# Patient Record
Sex: MALE | Race: WHITE | NOT HISPANIC OR LATINO | Employment: FULL TIME | URBAN - METROPOLITAN AREA
[De-identification: names, ages, dates, MRNs, and addresses within clinical notes are randomized per-mention and may not be internally consistent; named-entity substitution may affect disease eponyms.]

---

## 2017-10-23 ENCOUNTER — OFFICE VISIT - HEALTHEAST (OUTPATIENT)
Dept: FAMILY MEDICINE | Facility: CLINIC | Age: 27
End: 2017-10-23

## 2017-10-23 DIAGNOSIS — J34.89 NASAL SORE: ICD-10-CM

## 2017-10-23 ASSESSMENT — MIFFLIN-ST. JEOR: SCORE: 1893.88

## 2017-12-28 ENCOUNTER — OFFICE VISIT - HEALTHEAST (OUTPATIENT)
Dept: FAMILY MEDICINE | Facility: CLINIC | Age: 27
End: 2017-12-28

## 2017-12-28 DIAGNOSIS — B35.1 NAIL FUNGUS: ICD-10-CM

## 2017-12-28 DIAGNOSIS — R19.09 NODULE OF GROIN: ICD-10-CM

## 2018-04-05 ENCOUNTER — OFFICE VISIT - HEALTHEAST (OUTPATIENT)
Dept: FAMILY MEDICINE | Facility: CLINIC | Age: 28
End: 2018-04-05

## 2018-04-05 DIAGNOSIS — I73.00 RAYNAUD'S PHENOMENON WITHOUT GANGRENE: ICD-10-CM

## 2018-04-05 DIAGNOSIS — B35.1 NAIL FUNGUS: ICD-10-CM

## 2018-04-05 LAB
ALBUMIN SERPL-MCNC: 3.7 G/DL (ref 3.5–5)
ALP SERPL-CCNC: 70 U/L (ref 45–120)
ALT SERPL W P-5'-P-CCNC: 60 U/L (ref 0–45)
ANION GAP SERPL CALCULATED.3IONS-SCNC: 12 MMOL/L (ref 5–18)
AST SERPL W P-5'-P-CCNC: 64 U/L (ref 0–40)
BILIRUB SERPL-MCNC: 1.2 MG/DL (ref 0–1)
BUN SERPL-MCNC: 9 MG/DL (ref 8–22)
CALCIUM SERPL-MCNC: 9.5 MG/DL (ref 8.5–10.5)
CHLORIDE BLD-SCNC: 102 MMOL/L (ref 98–107)
CO2 SERPL-SCNC: 24 MMOL/L (ref 22–31)
CREAT SERPL-MCNC: 1.22 MG/DL (ref 0.7–1.3)
GFR SERPL CREATININE-BSD FRML MDRD: >60 ML/MIN/1.73M2
GLUCOSE BLD-MCNC: 75 MG/DL (ref 70–125)
POTASSIUM BLD-SCNC: 3.8 MMOL/L (ref 3.5–5)
PROT SERPL-MCNC: 7.3 G/DL (ref 6–8)
SODIUM SERPL-SCNC: 138 MMOL/L (ref 136–145)

## 2018-04-05 ASSESSMENT — MIFFLIN-ST. JEOR: SCORE: 1889.11

## 2018-04-06 ENCOUNTER — AMBULATORY - HEALTHEAST (OUTPATIENT)
Dept: FAMILY MEDICINE | Facility: CLINIC | Age: 28
End: 2018-04-06

## 2018-04-06 DIAGNOSIS — R79.89 ABNORMAL LFTS (LIVER FUNCTION TESTS): ICD-10-CM

## 2018-04-07 ENCOUNTER — COMMUNICATION - HEALTHEAST (OUTPATIENT)
Dept: FAMILY MEDICINE | Facility: CLINIC | Age: 28
End: 2018-04-07

## 2019-01-11 ENCOUNTER — OFFICE VISIT - HEALTHEAST (OUTPATIENT)
Dept: FAMILY MEDICINE | Facility: CLINIC | Age: 29
End: 2019-01-11

## 2019-01-11 DIAGNOSIS — B35.1 ONYCHOMYCOSIS: ICD-10-CM

## 2019-01-11 DIAGNOSIS — D21.9 FIBROMA: ICD-10-CM

## 2019-01-11 DIAGNOSIS — R21 RASH: ICD-10-CM

## 2019-01-11 ASSESSMENT — MIFFLIN-ST. JEOR: SCORE: 1952.62

## 2019-07-11 ENCOUNTER — OFFICE VISIT - HEALTHEAST (OUTPATIENT)
Dept: FAMILY MEDICINE | Facility: CLINIC | Age: 29
End: 2019-07-11

## 2019-07-11 DIAGNOSIS — S46.219A BICEPS STRAIN, INITIAL ENCOUNTER: ICD-10-CM

## 2019-07-11 DIAGNOSIS — R22.0 CHEEK SWELLING: ICD-10-CM

## 2019-07-11 ASSESSMENT — MIFFLIN-ST. JEOR: SCORE: 1957.15

## 2019-08-01 ENCOUNTER — COMMUNICATION - HEALTHEAST (OUTPATIENT)
Dept: SCHEDULING | Facility: CLINIC | Age: 29
End: 2019-08-01

## 2019-08-05 ENCOUNTER — OFFICE VISIT - HEALTHEAST (OUTPATIENT)
Dept: FAMILY MEDICINE | Facility: CLINIC | Age: 29
End: 2019-08-05

## 2019-08-05 DIAGNOSIS — M25.561 CHRONIC PAIN OF RIGHT KNEE: ICD-10-CM

## 2019-08-05 DIAGNOSIS — L73.1 INGROWN HAIR: ICD-10-CM

## 2019-08-05 DIAGNOSIS — G89.29 CHRONIC PAIN OF RIGHT KNEE: ICD-10-CM

## 2019-08-05 ASSESSMENT — MIFFLIN-ST. JEOR: SCORE: 1936.06

## 2019-11-25 ENCOUNTER — OFFICE VISIT - HEALTHEAST (OUTPATIENT)
Dept: FAMILY MEDICINE | Facility: CLINIC | Age: 29
End: 2019-11-25

## 2019-11-25 DIAGNOSIS — M70.21 OLECRANON BURSITIS OF RIGHT ELBOW: ICD-10-CM

## 2019-11-25 DIAGNOSIS — M25.521 RIGHT ELBOW PAIN: ICD-10-CM

## 2019-11-25 ASSESSMENT — MIFFLIN-ST. JEOR: SCORE: 1945.13

## 2019-12-26 ENCOUNTER — COMMUNICATION - HEALTHEAST (OUTPATIENT)
Dept: FAMILY MEDICINE | Facility: CLINIC | Age: 29
End: 2019-12-26

## 2019-12-26 DIAGNOSIS — M70.21 OLECRANON BURSITIS OF RIGHT ELBOW: ICD-10-CM

## 2020-01-20 ENCOUNTER — RECORDS - HEALTHEAST (OUTPATIENT)
Dept: ADMINISTRATIVE | Facility: OTHER | Age: 30
End: 2020-01-20

## 2020-10-16 ENCOUNTER — OFFICE VISIT - HEALTHEAST (OUTPATIENT)
Dept: FAMILY MEDICINE | Facility: CLINIC | Age: 30
End: 2020-10-16

## 2020-10-16 DIAGNOSIS — R12 HEARTBURN: ICD-10-CM

## 2020-10-16 DIAGNOSIS — R07.89 CHEST TIGHTNESS: ICD-10-CM

## 2020-10-19 LAB
ATRIAL RATE - MUSE: 62 BPM
DIASTOLIC BLOOD PRESSURE - MUSE: NORMAL
INTERPRETATION ECG - MUSE: NORMAL
P AXIS - MUSE: -3 DEGREES
PR INTERVAL - MUSE: 134 MS
QRS DURATION - MUSE: 92 MS
QT - MUSE: 374 MS
QTC - MUSE: 379 MS
R AXIS - MUSE: -36 DEGREES
SYSTOLIC BLOOD PRESSURE - MUSE: NORMAL
T AXIS - MUSE: 42 DEGREES
VENTRICULAR RATE- MUSE: 62 BPM

## 2020-10-23 ENCOUNTER — COMMUNICATION - HEALTHEAST (OUTPATIENT)
Dept: FAMILY MEDICINE | Facility: CLINIC | Age: 30
End: 2020-10-23

## 2020-10-23 ENCOUNTER — OFFICE VISIT - HEALTHEAST (OUTPATIENT)
Dept: FAMILY MEDICINE | Facility: CLINIC | Age: 30
End: 2020-10-23

## 2020-10-23 DIAGNOSIS — M62.89 MUSCLE TIGHTNESS: ICD-10-CM

## 2020-10-23 DIAGNOSIS — K21.9 GASTROESOPHAGEAL REFLUX DISEASE WITHOUT ESOPHAGITIS: ICD-10-CM

## 2020-10-23 DIAGNOSIS — R07.89 FEELING OF CHEST TIGHTNESS: ICD-10-CM

## 2020-10-23 LAB
ALBUMIN SERPL-MCNC: 4.8 G/DL (ref 3.5–5)
ALP SERPL-CCNC: 48 U/L (ref 45–120)
ALT SERPL W P-5'-P-CCNC: 21 U/L (ref 0–45)
ANION GAP SERPL CALCULATED.3IONS-SCNC: 10 MMOL/L (ref 5–18)
AST SERPL W P-5'-P-CCNC: 26 U/L (ref 0–40)
BILIRUB SERPL-MCNC: 1.4 MG/DL (ref 0–1)
BUN SERPL-MCNC: 18 MG/DL (ref 8–22)
CALCIUM SERPL-MCNC: 9.9 MG/DL (ref 8.5–10.5)
CHLORIDE BLD-SCNC: 105 MMOL/L (ref 98–107)
CO2 SERPL-SCNC: 26 MMOL/L (ref 22–31)
CREAT SERPL-MCNC: 1.19 MG/DL (ref 0.7–1.3)
ERYTHROCYTE [DISTWIDTH] IN BLOOD BY AUTOMATED COUNT: 14.5 % (ref 11–14.5)
GFR SERPL CREATININE-BSD FRML MDRD: >60 ML/MIN/1.73M2
GLUCOSE BLD-MCNC: 91 MG/DL (ref 70–125)
HCT VFR BLD AUTO: 49.2 % (ref 40–54)
HGB BLD-MCNC: 16.4 G/DL (ref 14–18)
MCH RBC QN AUTO: 27.8 PG (ref 27–34)
MCHC RBC AUTO-ENTMCNC: 33.3 G/DL (ref 32–36)
MCV RBC AUTO: 83 FL (ref 80–100)
PLATELET # BLD AUTO: 159 THOU/UL (ref 140–440)
PMV BLD AUTO: 10 FL (ref 7–10)
POTASSIUM BLD-SCNC: 4.7 MMOL/L (ref 3.5–5)
PROT SERPL-MCNC: 7.7 G/DL (ref 6–8)
RBC # BLD AUTO: 5.91 MILL/UL (ref 4.4–6.2)
SODIUM SERPL-SCNC: 141 MMOL/L (ref 136–145)
WBC: 5.2 THOU/UL (ref 4–11)

## 2020-10-23 ASSESSMENT — ANXIETY QUESTIONNAIRES
6. BECOMING EASILY ANNOYED OR IRRITABLE: SEVERAL DAYS
1. FEELING NERVOUS, ANXIOUS, OR ON EDGE: MORE THAN HALF THE DAYS
4. TROUBLE RELAXING: NOT AT ALL
IF YOU CHECKED OFF ANY PROBLEMS ON THIS QUESTIONNAIRE, HOW DIFFICULT HAVE THESE PROBLEMS MADE IT FOR YOU TO DO YOUR WORK, TAKE CARE OF THINGS AT HOME, OR GET ALONG WITH OTHER PEOPLE: NOT DIFFICULT AT ALL
GAD7 TOTAL SCORE: 3
2. NOT BEING ABLE TO STOP OR CONTROL WORRYING: NOT AT ALL
7. FEELING AFRAID AS IF SOMETHING AWFUL MIGHT HAPPEN: NOT AT ALL
3. WORRYING TOO MUCH ABOUT DIFFERENT THINGS: NOT AT ALL
5. BEING SO RESTLESS THAT IT IS HARD TO SIT STILL: NOT AT ALL

## 2020-11-10 ENCOUNTER — HOSPITAL ENCOUNTER (OUTPATIENT)
Dept: CARDIOLOGY | Facility: HOSPITAL | Age: 30
Discharge: HOME | End: 2020-11-10
Attending: NURSE PRACTITIONER

## 2020-11-10 DIAGNOSIS — R07.89 FEELING OF CHEST TIGHTNESS: ICD-10-CM

## 2020-11-13 ENCOUNTER — COMMUNICATION - HEALTHEAST (OUTPATIENT)
Dept: FAMILY MEDICINE | Facility: CLINIC | Age: 30
End: 2020-11-13

## 2020-12-01 ENCOUNTER — COMMUNICATION - HEALTHEAST (OUTPATIENT)
Dept: FAMILY MEDICINE | Facility: CLINIC | Age: 30
End: 2020-12-01

## 2020-12-01 DIAGNOSIS — R07.89 FEELING OF CHEST TIGHTNESS: ICD-10-CM

## 2020-12-01 DIAGNOSIS — R00.2 PALPITATIONS: ICD-10-CM

## 2020-12-11 ENCOUNTER — COMMUNICATION - HEALTHEAST (OUTPATIENT)
Dept: CARDIOLOGY | Facility: CLINIC | Age: 30
End: 2020-12-11

## 2020-12-14 ENCOUNTER — OFFICE VISIT - HEALTHEAST (OUTPATIENT)
Dept: CARDIOLOGY | Facility: CLINIC | Age: 30
End: 2020-12-14

## 2020-12-14 DIAGNOSIS — R07.9 EXERTIONAL CHEST PAIN: ICD-10-CM

## 2020-12-14 DIAGNOSIS — Q21.12 PFO (PATENT FORAMEN OVALE): ICD-10-CM

## 2020-12-14 DIAGNOSIS — R06.09 DYSPNEA ON EXERTION: ICD-10-CM

## 2020-12-14 DIAGNOSIS — R00.2 PALPITATIONS: ICD-10-CM

## 2020-12-28 ENCOUNTER — OFFICE VISIT (OUTPATIENT)
Dept: FAMILY MEDICINE | Facility: CLINIC | Age: 30
End: 2020-12-28
Payer: COMMERCIAL

## 2020-12-28 VITALS
WEIGHT: 221 LBS | RESPIRATION RATE: 16 BRPM | HEART RATE: 73 BPM | SYSTOLIC BLOOD PRESSURE: 120 MMHG | BODY MASS INDEX: 32.73 KG/M2 | OXYGEN SATURATION: 98 % | TEMPERATURE: 98.3 F | DIASTOLIC BLOOD PRESSURE: 80 MMHG | HEIGHT: 69 IN

## 2020-12-28 DIAGNOSIS — F41.9 ANXIETY: ICD-10-CM

## 2020-12-28 DIAGNOSIS — F32.1 CURRENT MODERATE EPISODE OF MAJOR DEPRESSIVE DISORDER WITHOUT PRIOR EPISODE (H): Primary | ICD-10-CM

## 2020-12-28 PROBLEM — L70.9 ACNE: Status: ACTIVE | Noted: 2020-12-28

## 2020-12-28 PROBLEM — B35.1 NAIL FUNGUS: Status: ACTIVE | Noted: 2018-04-05

## 2020-12-28 PROBLEM — I73.00 RAYNAUD'S PHENOMENON WITHOUT GANGRENE: Status: ACTIVE | Noted: 2018-04-05

## 2020-12-28 PROCEDURE — 96127 BRIEF EMOTIONAL/BEHAV ASSMT: CPT | Mod: 59 | Performed by: NURSE PRACTITIONER

## 2020-12-28 PROCEDURE — 99214 OFFICE O/P EST MOD 30 MIN: CPT | Performed by: NURSE PRACTITIONER

## 2020-12-28 RX ORDER — FLUTICASONE PROPIONATE 50 MCG
1 SPRAY, SUSPENSION (ML) NASAL DAILY
COMMUNITY
End: 2021-02-26

## 2020-12-28 SDOH — HEALTH STABILITY: MENTAL HEALTH: HOW OFTEN DO YOU HAVE A DRINK CONTAINING ALCOHOL?: MONTHLY OR LESS

## 2020-12-28 SDOH — HEALTH STABILITY: MENTAL HEALTH: HOW OFTEN DO YOU HAVE 6 OR MORE DRINKS ON ONE OCCASION?: NOT ASKED

## 2020-12-28 SDOH — HEALTH STABILITY: MENTAL HEALTH: HOW MANY STANDARD DRINKS CONTAINING ALCOHOL DO YOU HAVE ON A TYPICAL DAY?: 1 OR 2

## 2020-12-28 ASSESSMENT — ANXIETY QUESTIONNAIRES
1. FEELING NERVOUS, ANXIOUS, OR ON EDGE: MORE THAN HALF THE DAYS
6. BECOMING EASILY ANNOYED OR IRRITABLE: SEVERAL DAYS
IF YOU CHECKED OFF ANY PROBLEMS ON THIS QUESTIONNAIRE, HOW DIFFICULT HAVE THESE PROBLEMS MADE IT FOR YOU TO DO YOUR WORK, TAKE CARE OF THINGS AT HOME, OR GET ALONG WITH OTHER PEOPLE: SOMEWHAT DIFFICULT
2. NOT BEING ABLE TO STOP OR CONTROL WORRYING: SEVERAL DAYS
GAD7 TOTAL SCORE: 10
3. WORRYING TOO MUCH ABOUT DIFFERENT THINGS: MORE THAN HALF THE DAYS
7. FEELING AFRAID AS IF SOMETHING AWFUL MIGHT HAPPEN: SEVERAL DAYS
5. BEING SO RESTLESS THAT IT IS HARD TO SIT STILL: MORE THAN HALF THE DAYS

## 2020-12-28 ASSESSMENT — PATIENT HEALTH QUESTIONNAIRE - PHQ9
5. POOR APPETITE OR OVEREATING: SEVERAL DAYS
SUM OF ALL RESPONSES TO PHQ QUESTIONS 1-9: 11

## 2020-12-28 ASSESSMENT — MIFFLIN-ST. JEOR: SCORE: 1966.79

## 2020-12-28 NOTE — PATIENT INSTRUCTIONS
Our Clinic hours are:  Mondays    7:20 am - 7 pm  Tues -  Fri  7:20 am - 5 pm    Clinic Phone: [phone]    The clinic lab opens at 7:30 am Mon - Fri and appointments are required.    [facility]. [phone]  Monday  8 am - 7pm  Tues - Fri 8 am - 5:30 pm

## 2020-12-28 NOTE — PROGRESS NOTES
Subjective     Emmett Infante is a 30 year old male who presents to clinic today for the following health issues:    HPI         Abnormal Mood Symptoms  Onset/Duration: about 3 weeks patient  Feelings have  gotten worse . But he has had some anxiety for about 2-3 years   Description:   Depression (if yes, do PHQ-9): YES  Anxiety (if yes, do MARTHA-7): YES  Accompanying Signs & Symptoms:  Still participating in activities that you used to enjoy: no  Fatigue: YES  Irritability: YES  Difficulty concentrating: YES  Changes in appetite: YES  Problems with sleep: YES  Heart racing/beating fast: YES  Abnormally elevated, expansive, or irritable mood: YES  Persistently increased activity or energy: YES  Thoughts of hurting yourself or others: no  History:  Recent stress or major life event: no  Prior depression or anxiety: yes   Family history of depression or anxiety: no  Alcohol/drug use: no  Difficulty sleeping: YES  Precipitating or alleviating factors: None  Therapies tried and outcome: individual therapy    PHQ 12/28/2020   PHQ-9 Total Score 11   Q9: Thoughts of better off dead/self-harm past 2 weeks Not at all     MARTHA-7 SCORE 12/28/2020   Total Score 10     He has not been on medication for his mental health in the past. He had been having heart palpitations. Recently had a holter monitor to assess this and it was normal. He has not been working out as often as he would like. He has been working with this therapist weekly. Overall symptoms have seemed to even out. He has been having difficulty with sleeping and has concerns of possible deviated septum. He did have this evaluated by the ENT recently. He is following up with ENT regarding the sleeping issue.     Review of Systems   Constitutional, HEENT, cardiovascular, pulmonary, GI, , musculoskeletal, neuro, skin, endocrine and psych systems are negative, except as otherwise noted.      Objective    /80 (BP Location: Right arm, Patient Position: Chair, Cuff Size:  "Adult Large)   Pulse 73   Temp 98.3  F (36.8  C)   Resp 16   Ht 1.759 m (5' 9.25\")   Wt 100.2 kg (221 lb)   SpO2 98%   BMI 32.40 kg/m    Body mass index is 32.4 kg/m .  Physical Exam   GENERAL: healthy, alert and no distress  NECK: no adenopathy, no asymmetry, masses, or scars and thyroid normal to palpation  RESP: lungs clear to auscultation - no rales, rhonchi or wheezes  CV: regular rate and rhythm, normal S1 S2, no S3 or S4, no murmur, click or rub, no peripheral edema and peripheral pulses strong  ABDOMEN: soft, nontender, no hepatosplenomegaly, no masses and bowel sounds normal  MS: no gross musculoskeletal defects noted, no edema  PSYCH: mentation appears normal, affect normal/bright    Labs completed in October are normal. Normal Thyroid hx.           Assessment & Plan     Emmett was seen today for establish care and anxiety.    Diagnoses and all orders for this visit:    Current moderate episode of major depressive disorder without prior episode (H)  -     sertraline (ZOLOFT) 50 MG tablet; Take 1 tablet (50 mg) by mouth daily    Anxiety    Opting to start patient on 50 mg of sertraline for management of anxiety and depression.  Reviewed previous labs as well as notes.  He was a previous patient of mine from the [facility].  Recommend following up with a video visit in approximately 4 to 6 weeks to reassess tolerance to medication.  He could certainly follow-up with me sooner as needed.  Discussed risks and benefits of the medication as well as potential side effects.  Patient is in agreement this plan.     BMI:   Estimated body mass index is 32.4 kg/m  as calculated from the following:    Height as of this encounter: 1.759 m (5' 9.25\").    Weight as of this encounter: 100.2 kg (221 lb).          Depression Screening Follow Up    PHQ 12/28/2020   PHQ-9 Total Score 11   Q9: Thoughts of better off dead/self-harm past 2 weeks Not at all         Follow Up Actions Taken  Patient counseled, no " additional follow up at this time.             No follow-ups on file.    FIDEL Tsang CNP  [facility]

## 2020-12-28 NOTE — LETTER
My Depression Action Plan  Name: Emmett Infante   Date of Birth 1990  Date: 12/28/2020    My doctor: Alberto [facility]   My clinic: [facility]  [id number]  [facility] [address]  [phone]          GREEN    ZONE   Good Control    What it looks like:     Things are going generally well. You have normal ups and downs. You may even feel depressed from time to time, but bad moods usually last less than a day.   What you need to do:  1. Continue to care for yourself (see self care plan)  2. Check your depression survival kit and update it as needed  3. Follow your physician s recommendations including any medication.  4. Do not stop taking medication unless you consult with your physician first.           YELLOW         ZONE Getting Worse    What it looks like:     Depression is starting to interfere with your life.     It may be hard to get out of bed; you may be starting to isolate yourself from others.    Symptoms of depression are starting to last most all day and this has happened for several days.     You may have suicidal thoughts but they are not constant.   What you need to do:     1. Call your care team. Your response to treatment will improve if you keep your care team informed of your progress. Yellow periods are signs an adjustment may need to be made.     2. Continue your self-care.  Just get dressed and ready for the day.  Don't give yourself time to talk yourself out of it.    3. Talk to someone in your support network.    4. Open up your Depression Self-Care Plan/Wellness Kit.           RED    ZONE Medical Alert - Get Help    What it looks like:     Depression is seriously interfering with your life.     You may experience these or other symptoms: You can t get out of bed most days, can t work or engage in other necessary activities, you have trouble taking care of basic hygiene, or basic responsibilities, thoughts of suicide or  death that will not go away, self-injurious behavior.     What you need to do:  1. Call your care team and request a same-day appointment. If they are not available (weekends or after hours) call your local crisis line, emergency room or 911.            Depression Self-Care Plan / Wellness Kit    Self-Care for Depression  Here s the deal. Your body and mind are really not as separate as most people think.  What you do and think affects how you feel and how you feel influences what you do and think. This means if you do things that people who feel good do, it will help you feel better.  Sometimes this is all it takes.  There is also a place for medication and therapy depending on how severe your depression is, so be sure to consult with your medical provider and/ or Behavioral Health Consultant if your symptoms are worsening or not improving.     In order to better manage my stress, I will:    Exercise  Get some form of exercise, every day. This will help reduce pain and release endorphins, the  feel good  chemicals in your brain. This is almost as good as taking antidepressants!  This is not the same as joining a gym and then never going! (they count on that by the way ) It can be as simple as just going for a walk or doing some gardening, anything that will get you moving.      Hygiene   Maintain good hygiene (get out of bed in the morning, make your bed, brush your teeth, take a shower, and get dressed like you were going to work, even if you are unemployed).  If your clothes don't fit try to get ones that do.    Diet  Strive to eat foods that are good for me, drink plenty of water, and avoid excessive sugar, caffeine, alcohol, and other mood-altering substances.  Some foods that are helpful in depression are: complex carbohydrates, B vitamins, flaxseed, fish or fish oil, fresh fruits and vegetables.    Psychotherapy  Agree to participate in Individual Therapy (if recommended).    Medication  If prescribed  medications, I agree to take them.  Missing doses can result in serious side effects.  I understand that drinking alcohol, or other illicit drug use, may cause potential side effects.  I will not stop my medication abruptly without first discussing it with my provider.    Staying Connected With Others  Stay in touch with my friends, family members, and my primary care provider/team.    Use your imagination  Be creative.  We all have a creative side; it doesn t matter if it s oil painting, sand castles, or mud pies! This will also kick up the endorphins.    Witness Beauty  (AKA stop and smell the roses) Take a look outside, even in mid-winter. Notice colors, textures. Watch the squirrels and birds.     Service to others  Be of service to others.  There is always someone else in need.  By helping others we can  get out of ourselves  and remember the really important things.  This also provides opportunities for practicing all the other parts of the program.    Humor  Laugh and be silly!  Adjust your TV habits for less news and crime-drama and more comedy.    Control your stress  Try breathing deep, massage therapy, biofeedback, and meditation. Find time to relax each day.     Crisis Text Line  http://www.crisistextline.org    The Crisis Text Line serves anyone, in any type of crisis, providing access to free, 24/7 support and information via the medium people already use and trust:    Here's how it works:  1.  Text 074-821 from anywhere in the [place], anytime, about any type of crisis.  2.  A live, trained Crisis Counselor receives the text and responds quickly.  3.  The volunteer Crisis Counselor will help you move from a 'hot moment to a cool moment'.    My support system    Clinic Contact:  Phone number:    Contact 1:  Phone number:    Contact 2:  Phone number:    Synagogue/:  Phone number:    Therapist:  Phone number:    Local crisis center:    Phone number:    Other community support:  Phone number:

## 2020-12-29 ASSESSMENT — ANXIETY QUESTIONNAIRES: GAD7 TOTAL SCORE: 10

## 2021-01-02 ENCOUNTER — MYC MEDICAL ADVICE (OUTPATIENT)
Dept: FAMILY MEDICINE | Facility: CLINIC | Age: 31
End: 2021-01-02

## 2021-01-06 ENCOUNTER — HOSPITAL ENCOUNTER (OUTPATIENT)
Dept: CARDIOLOGY | Facility: HOSPITAL | Age: 31
Discharge: HOME | End: 2021-01-06
Attending: GENERAL ACUTE CARE HOSPITAL

## 2021-01-06 DIAGNOSIS — R07.9 EXERTIONAL CHEST PAIN: ICD-10-CM

## 2021-01-06 DIAGNOSIS — R06.09 DYSPNEA ON EXERTION: ICD-10-CM

## 2021-01-06 LAB
CV STRESS CURRENT BP HE: NORMAL
CV STRESS CURRENT HR HE: 100
CV STRESS CURRENT HR HE: 103
CV STRESS CURRENT HR HE: 111
CV STRESS CURRENT HR HE: 112
CV STRESS CURRENT HR HE: 112
CV STRESS CURRENT HR HE: 114
CV STRESS CURRENT HR HE: 116
CV STRESS CURRENT HR HE: 117
CV STRESS CURRENT HR HE: 119
CV STRESS CURRENT HR HE: 129
CV STRESS CURRENT HR HE: 131
CV STRESS CURRENT HR HE: 133
CV STRESS CURRENT HR HE: 140
CV STRESS CURRENT HR HE: 143
CV STRESS CURRENT HR HE: 145
CV STRESS CURRENT HR HE: 158
CV STRESS CURRENT HR HE: 163
CV STRESS CURRENT HR HE: 166
CV STRESS CURRENT HR HE: 167
CV STRESS CURRENT HR HE: 175
CV STRESS CURRENT HR HE: 75
CV STRESS CURRENT HR HE: 81
CV STRESS CURRENT HR HE: 85
CV STRESS CURRENT HR HE: 90
CV STRESS CURRENT HR HE: 94
CV STRESS CURRENT HR HE: 94
CV STRESS CURRENT HR HE: 98
CV STRESS CURRENT HR HE: 98
CV STRESS CURRENT HR HE: 99
CV STRESS CURRENT HR HE: 99
CV STRESS DEVIATION TIME HE: NORMAL
CV STRESS ECHO PERCENT HR HE: NORMAL
CV STRESS EXERCISE STAGE HE: NORMAL
CV STRESS FINAL RESTING BP HE: NORMAL
CV STRESS FINAL RESTING HR HE: 98
CV STRESS MAX HR HE: 175
CV STRESS MAX TREADMILL GRADE HE: 16
CV STRESS MAX TREADMILL SPEED HE: 4.2
CV STRESS PEAK DIA BP HE: NORMAL
CV STRESS PEAK SYS BP HE: NORMAL
CV STRESS PHASE HE: NORMAL
CV STRESS PROTOCOL HE: NORMAL
CV STRESS RESTING PT POSITION HE: NORMAL
CV STRESS ST DEVIATION AMOUNT HE: NORMAL
CV STRESS ST DEVIATION ELEVATION HE: NORMAL
CV STRESS ST EVELATION AMOUNT HE: NORMAL
CV STRESS TEST TYPE HE: NORMAL
CV STRESS TOTAL STAGE TIME MIN 1 HE: NORMAL
ECHO EJECTION FRACTION ESTIMATED: 65 %
RATE PRESSURE PRODUCT: NORMAL
STRESS ECHO BASELINE DIASTOLIC HE: 92
STRESS ECHO BASELINE HR: 78
STRESS ECHO BASELINE SYSTOLIC BP: 130
STRESS ECHO CALCULATED PERCENT HR: 91 %
STRESS ECHO LAST STRESS DIASTOLIC BP: 60
STRESS ECHO LAST STRESS HR: 175
STRESS ECHO LAST STRESS SYSTOLIC BP: 174
STRESS ECHO POST ESTIMATED WORKLOAD: 12.1
STRESS ECHO POST EXERCISE DUR MIN: 11
STRESS ECHO POST EXERCISE DUR SEC: 59
STRESS ECHO TARGET HR: 192

## 2021-01-07 ENCOUNTER — COMMUNICATION - HEALTHEAST (OUTPATIENT)
Dept: CARDIOLOGY | Facility: CLINIC | Age: 31
End: 2021-01-07

## 2021-01-12 ENCOUNTER — COMMUNICATION - HEALTHEAST (OUTPATIENT)
Dept: FAMILY MEDICINE | Facility: CLINIC | Age: 31
End: 2021-01-12

## 2021-01-12 ENCOUNTER — COMMUNICATION - HEALTHEAST (OUTPATIENT)
Dept: SCHEDULING | Facility: CLINIC | Age: 31
End: 2021-01-12

## 2021-01-15 ENCOUNTER — HEALTH MAINTENANCE LETTER (OUTPATIENT)
Age: 31
End: 2021-01-15

## 2021-02-26 ENCOUNTER — VIRTUAL VISIT (OUTPATIENT)
Dept: FAMILY MEDICINE | Facility: CLINIC | Age: 31
End: 2021-02-26
Payer: COMMERCIAL

## 2021-02-26 DIAGNOSIS — F41.9 ANXIETY: ICD-10-CM

## 2021-02-26 DIAGNOSIS — G44.209 TENSION HEADACHE: ICD-10-CM

## 2021-02-26 DIAGNOSIS — F32.4 MAJOR DEPRESSIVE DISORDER WITH SINGLE EPISODE, IN PARTIAL REMISSION (H): Primary | ICD-10-CM

## 2021-02-26 PROCEDURE — 99213 OFFICE O/P EST LOW 20 MIN: CPT | Mod: 95 | Performed by: NURSE PRACTITIONER

## 2021-02-26 ASSESSMENT — ANXIETY QUESTIONNAIRES
6. BECOMING EASILY ANNOYED OR IRRITABLE: SEVERAL DAYS
3. WORRYING TOO MUCH ABOUT DIFFERENT THINGS: SEVERAL DAYS
7. FEELING AFRAID AS IF SOMETHING AWFUL MIGHT HAPPEN: NOT AT ALL
1. FEELING NERVOUS, ANXIOUS, OR ON EDGE: SEVERAL DAYS
5. BEING SO RESTLESS THAT IT IS HARD TO SIT STILL: SEVERAL DAYS
GAD7 TOTAL SCORE: 4
2. NOT BEING ABLE TO STOP OR CONTROL WORRYING: NOT AT ALL
IF YOU CHECKED OFF ANY PROBLEMS ON THIS QUESTIONNAIRE, HOW DIFFICULT HAVE THESE PROBLEMS MADE IT FOR YOU TO DO YOUR WORK, TAKE CARE OF THINGS AT HOME, OR GET ALONG WITH OTHER PEOPLE: SOMEWHAT DIFFICULT

## 2021-02-26 ASSESSMENT — PATIENT HEALTH QUESTIONNAIRE - PHQ9
SUM OF ALL RESPONSES TO PHQ QUESTIONS 1-9: 5
5. POOR APPETITE OR OVEREATING: NOT AT ALL

## 2021-02-26 NOTE — PROGRESS NOTES
"Emmett is a 30 year old who is being evaluated via a billable video visit.      How would you like to obtain your AVS? MyChart  If the video visit is dropped, the invitation should be resent by: Text to cell phone: [phone]  Will anyone else be joining your video visit? No      Video Start Time: 3:05pm    Assessment & Plan       ICD-10-CM    1. Major depressive disorder with single episode, in partial remission (H)  F32.4    2. Anxiety  F41.9    3. Tension headache  G44.209      Overall doing well with management of depression.  50 mg of sertraline has been working well for his management of depression and anxiety.  AMRTHA-7 score today is 4, PHQ-9 score today is 5.  Scores have significantly reduced.  Commend continuing on sertraline no dose change made today.  Reviewed risks and benefits of medication as well as potential side effects.  Follow up as needed or in 6 months for medication follow up.     Headache is likely from his activity and posture.  Discussed reducing weights when weight lifting do not pull tension in the neck.  Also advised stretching exercises to reduce tension.  If symptoms are not improving will evaluate further.         BMI:   Estimated body mass index is 32.4 kg/m  as calculated from the following:    Height as of 12/28/20: 1.759 m (5' 9.25\").    Weight as of 12/28/20: 100.2 kg (221 lb).           Return in about 6 months (around 8/26/2021) for depression.    FIDEL Tsang CNP  [facility]    Subjective   Emmett is a 30 year old who presents for the following health issues     HPI     Started zoloft 50mg has noticed some increased sweating on his palms of his hands. He has also noticed some increased ease of bleeding. Denies any sexual side effects.  Has noted improvement in depression overall. Has had improvement in response for anxiety symptoms.     He also mentions increased incidence of headaches since increasing working out again.  Reports that typically " with stretching or popping his trapezius symptoms improve.  They have not been persistent headaches typically resolve quickly.    Depression Followup    How are you doing with your depression since your last visit? Improved     Are you having other symptoms that might be associated with depression? No    Have you had a significant life event?  No     Are you feeling anxious or having panic attacks?   Yes:  anxious on and off    Do you have any concerns with your use of alcohol or other drugs? No    Social History     Tobacco Use     Smoking status: Never Smoker     Smokeless tobacco: Never Used   Substance Use Topics     Alcohol use: Yes     Alcohol/week: 2.0 standard drinks     Types: 2 Cans of beer per week     Frequency: Monthly or less     Drinks per session: 1 or 2     Drug use: Never     PHQ 12/28/2020 2/26/2021   PHQ-9 Total Score 11 5   Q9: Thoughts of better off dead/self-harm past 2 weeks Not at all Not at all     MARTHA-7 SCORE 12/28/2020 2/26/2021   Total Score 10 4     Last PHQ-9 2/26/2021   1.  Little interest or pleasure in doing things 1   2.  Feeling down, depressed, or hopeless 0   3.  Trouble falling or staying asleep, or sleeping too much 1   4.  Feeling tired or having little energy 1   5.  Poor appetite or overeating 1   6.  Feeling bad about yourself 0   7.  Trouble concentrating 1   8.  Moving slowly or restless 0   Q9: Thoughts of better off dead/self-harm past 2 weeks 0   PHQ-9 Total Score 5   Difficulty at work, home, or with people Somewhat difficult     MARTHA-7  2/26/2021   1. Feeling nervous, anxious, or on edge 1   2. Not being able to stop or control worrying 0   3. Worrying too much about different things 1   4. Trouble relaxing 0   5. Being so restless that it is hard to sit still 1   6. Becoming easily annoyed or irritable 1   7. Feeling afraid, as if something awful might happen 0   MARTHA-7 Total Score 4   If you checked any problems, how difficult have they made it for you to do your  work, take care of things at home, or get along with other people? Somewhat difficult       Suicide Assessment Five-step Evaluation and Treatment (SAFE-T)      How many servings of fruits and vegetables do you eat daily?  2-3    On average, how many sweetened beverages do you drink each day (Examples: soda, juice, sweet tea, etc.  Do NOT count diet or artificially sweetened beverages)?   1    How many days per week do you exercise enough to make your heart beat faster? 3 or less    How many minutes a day do you exercise enough to make your heart beat faster? 30 - 60    How many days per week do you miss taking your medication? 0        Review of Systems   Constitutional, HEENT, cardiovascular, pulmonary, gi and gu systems are negative, except as otherwise noted.      Objective           Vitals:  No vitals were obtained today due to virtual visit.    Physical Exam   GENERAL: Healthy, alert and no distress  EYES: Eyes grossly normal to inspection.  No discharge or erythema, or obvious scleral/conjunctival abnormalities.  RESP: No audible wheeze, cough, or visible cyanosis.  No visible retractions or increased work of breathing.    SKIN: Visible skin clear. No significant rash, abnormal pigmentation or lesions.  NEURO: Cranial nerves grossly intact.  Mentation and speech appropriate for age.  PSYCH: Mentation appears normal, affect normal/bright, judgement and insight intact, normal speech and appearance well-groomed.          Video-Visit Details    Type of service:  Video Visit    Video End Time:3:20pm    Originating Location (pt. Location): Home    Distant Location (provider location):  [facility]     Platform used for Video Visit: MINDBODY

## 2021-02-27 ASSESSMENT — ANXIETY QUESTIONNAIRES: GAD7 TOTAL SCORE: 4

## 2021-04-01 ENCOUNTER — MYC MEDICAL ADVICE (OUTPATIENT)
Dept: FAMILY MEDICINE | Facility: CLINIC | Age: 31
End: 2021-04-01

## 2021-04-02 ASSESSMENT — PATIENT HEALTH QUESTIONNAIRE - PHQ9
10. IF YOU CHECKED OFF ANY PROBLEMS, HOW DIFFICULT HAVE THESE PROBLEMS MADE IT FOR YOU TO DO YOUR WORK, TAKE CARE OF THINGS AT HOME, OR GET ALONG WITH OTHER PEOPLE: SOMEWHAT DIFFICULT
SUM OF ALL RESPONSES TO PHQ QUESTIONS 1-9: 8
SUM OF ALL RESPONSES TO PHQ QUESTIONS 1-9: 8

## 2021-04-03 ASSESSMENT — PATIENT HEALTH QUESTIONNAIRE - PHQ9: SUM OF ALL RESPONSES TO PHQ QUESTIONS 1-9: 8

## 2021-04-25 ENCOUNTER — MYC MEDICAL ADVICE (OUTPATIENT)
Dept: FAMILY MEDICINE | Facility: CLINIC | Age: 31
End: 2021-04-25

## 2021-05-03 ENCOUNTER — OFFICE VISIT (OUTPATIENT)
Dept: FAMILY MEDICINE | Facility: CLINIC | Age: 31
End: 2021-05-03
Payer: COMMERCIAL

## 2021-05-03 VITALS
WEIGHT: 226 LBS | HEART RATE: 65 BPM | SYSTOLIC BLOOD PRESSURE: 110 MMHG | TEMPERATURE: 96.6 F | RESPIRATION RATE: 16 BRPM | BODY MASS INDEX: 33.47 KG/M2 | OXYGEN SATURATION: 99 % | HEIGHT: 69 IN | DIASTOLIC BLOOD PRESSURE: 62 MMHG

## 2021-05-03 DIAGNOSIS — F41.9 ANXIETY: ICD-10-CM

## 2021-05-03 DIAGNOSIS — D17.30 LIPOMA OF SKIN AND SUBCUTANEOUS TISSUE: Primary | ICD-10-CM

## 2021-05-03 DIAGNOSIS — F32.1 CURRENT MODERATE EPISODE OF MAJOR DEPRESSIVE DISORDER WITHOUT PRIOR EPISODE (H): ICD-10-CM

## 2021-05-03 PROCEDURE — 99214 OFFICE O/P EST MOD 30 MIN: CPT | Performed by: NURSE PRACTITIONER

## 2021-05-03 RX ORDER — SERTRALINE HYDROCHLORIDE 100 MG/1
100 TABLET, FILM COATED ORAL DAILY
Qty: 90 TABLET | Refills: 1 | Status: SHIPPED | OUTPATIENT
Start: 2021-05-03 | End: 2021-11-15

## 2021-05-03 RX ORDER — CHLORAL HYDRATE 500 MG
2 CAPSULE ORAL
COMMUNITY

## 2021-05-03 ASSESSMENT — ANXIETY QUESTIONNAIRES
4. TROUBLE RELAXING: SEVERAL DAYS
1. FEELING NERVOUS, ANXIOUS, OR ON EDGE: MORE THAN HALF THE DAYS
6. BECOMING EASILY ANNOYED OR IRRITABLE: SEVERAL DAYS
2. NOT BEING ABLE TO STOP OR CONTROL WORRYING: SEVERAL DAYS
3. WORRYING TOO MUCH ABOUT DIFFERENT THINGS: MORE THAN HALF THE DAYS
GAD7 TOTAL SCORE: 8
5. BEING SO RESTLESS THAT IT IS HARD TO SIT STILL: SEVERAL DAYS
7. FEELING AFRAID AS IF SOMETHING AWFUL MIGHT HAPPEN: NOT AT ALL

## 2021-05-03 ASSESSMENT — PATIENT HEALTH QUESTIONNAIRE - PHQ9: SUM OF ALL RESPONSES TO PHQ QUESTIONS 1-9: 6

## 2021-05-03 ASSESSMENT — MIFFLIN-ST. JEOR: SCORE: 1985.51

## 2021-05-03 NOTE — PATIENT INSTRUCTIONS
Our Clinic hours are:  Mondays    7:20 am - 7 pm  Tues -  Fri  7:20 am - 5 pm    Clinic Phone: [phone]    The clinic lab opens at 7:30 am Mon - Fri and appointments are required.    [facility]. [phone]  Monday  8 am - 7pm  Tues - Fri 8 am - 5:30 pm

## 2021-05-03 NOTE — PROGRESS NOTES
"    Assessment & Plan     Lipoma of skin and subcutaneous tissue  Reassured patient that the lesion appears benign and no further workup is needed at this point. If symptoms change would recommend ultrasound to evaluate further.     Current moderate episode of major depressive disorder without prior episode (H)  Increased dose of sertraline to 100mg today, follow up if symptoms are not improving.   - sertraline (ZOLOFT) 100 MG tablet; Take 1 tablet (100 mg) by mouth daily    Anxiety  See above.   - sertraline (ZOLOFT) 100 MG tablet; Take 1 tablet (100 mg) by mouth daily             BMI:   Estimated body mass index is 33.37 kg/m  as calculated from the following:    Height as of this encounter: 1.753 m (5' 9\").    Weight as of this encounter: 102.5 kg (226 lb).           Return in about 6 months (around 11/3/2021) for medication follow up and physical. .    Monika Strong, FIDEL CNP  [facility]    Bart Christine is a 30 year old who presents for the following health issues  accompanied by his self :    HPI       Chief Complaint   Patient presents with     Mass     patient has a lump on the back of his left upper arm x 1 mo. Patient states it is staying the same in size and is a bit painful when he puts pressure on the area . Patient would rate this pain at about a 5 .     Has had some lipomas in the past. He has some minor tenderness to the bum in the back of his left arm in his lower tricep.  He has a similar bump on his right rib cage as well as left upper arm.  He also mentions he was diagnosed with a lipoma in his brain as well. He has been seen by Neurosurgery regarding this through [facility].  This was thought to be likely benign. He will be followed by neuro onc for monitoring of the lesion.     IMPRESSION:    1. 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  2. Otherwise unremarkable MRI of the brain.    Depression and Anxiety Follow-Up    How are " you doing with your depression since your last visit? Improved depression, anxiety slightly worse.     How are you doing with your anxiety since your last visit?  Monitoring and adjusting medications. Is now on 75mg and is planning on increasing to 100mg of sertraline.     Are you having other symptoms that might be associated with depression or anxiety? Yes:  life stressors/ work.     Have you had a significant life event? No     Do you have any concerns with your use of alcohol or other drugs? No    Social History     Tobacco Use     Smoking status: Never Smoker     Smokeless tobacco: Never Used   Substance Use Topics     Alcohol use: Yes     Alcohol/week: 2.0 standard drinks     Types: 2 Cans of beer per week     Frequency: Monthly or less     Drinks per session: 1 or 2     Drug use: Never     PHQ 2/26/2021 4/2/2021 5/3/2021   PHQ-9 Total Score 5 8 6   Q9: Thoughts of better off dead/self-harm past 2 weeks Not at all Several days Not at all   F/U: Thoughts of suicide or self-harm - No -   F/U: Safety concerns - No -     MARTHA-7 SCORE 12/28/2020 2/26/2021 5/3/2021   Total Score 10 4 8     Last PHQ-9 5/3/2021   1.  Little interest or pleasure in doing things 0   2.  Feeling down, depressed, or hopeless 2   3.  Trouble falling or staying asleep, or sleeping too much 1   4.  Feeling tired or having little energy 1   5.  Poor appetite or overeating 1   6.  Feeling bad about yourself 0   7.  Trouble concentrating 1   8.  Moving slowly or restless 0   Q9: Thoughts of better off dead/self-harm past 2 weeks 0   PHQ-9 Total Score 6   Difficulty at work, home, or with people -   In the past two weeks have you had thoughts of suicide or self harm? -   Do you have concerns about your personal safety or the safety of others? -     MARTHA-7  5/3/2021   1. Feeling nervous, anxious, or on edge 2   2. Not being able to stop or control worrying 1   3. Worrying too much about different things 2   4. Trouble relaxing 1   5. Being so  "restless that it is hard to sit still 1   6. Becoming easily annoyed or irritable 1   7. Feeling afraid, as if something awful might happen 0   MARTHA-7 Total Score 8   If you checked any problems, how difficult have they made it for you to do your work, take care of things at home, or get along with other people? -       Review of Systems   Constitutional, HEENT, cardiovascular, pulmonary, gi and gu systems are negative, except as otherwise noted.      Objective    /62 (BP Location: Right arm, Patient Position: Chair, Cuff Size: Adult Large)   Pulse 65   Temp 96.6  F (35.9  C)   Resp 16   Ht 1.753 m (5' 9\")   Wt 102.5 kg (226 lb)   SpO2 99%   BMI 33.37 kg/m    Body mass index is 33.37 kg/m .     Physical Exam   GENERAL: healthy, alert and no distress  MS: no gross musculoskeletal defects noted, no edema  SKIN: no suspicious lesions or rashes and papule - round, firm/ slightly fluctuant. No significant pain to palpation.   NEURO: Normal strength and tone, mentation intact and speech normal  PSYCH: mentation appears normal, affect normal/bright  LYMPH: no cervical, supraclavicular, axillary, or inguinal adenopathy                "

## 2021-05-04 ASSESSMENT — ANXIETY QUESTIONNAIRES: GAD7 TOTAL SCORE: 8

## 2021-05-27 VITALS
DIASTOLIC BLOOD PRESSURE: 79 MMHG | HEART RATE: 65 BPM | OXYGEN SATURATION: 100 % | SYSTOLIC BLOOD PRESSURE: 126 MMHG | TEMPERATURE: 97.9 F | RESPIRATION RATE: 18 BRPM

## 2021-05-28 ASSESSMENT — ANXIETY QUESTIONNAIRES: GAD7 TOTAL SCORE: 3

## 2021-05-30 NOTE — PROGRESS NOTES
"  Assessment/ Plan:         1. Biceps strain, initial encounter     2. Cheek swelling       Upper extremity pain likely secondary to a biceps strain versus an acute epicondylitis.  Advised patient to avoid activities that exacerbate the pain and to allow plenty of rest.  Also encouraged stretching and massage to help with the symptoms.  Ice may also be helpful.  Discussed with the patient that if symptoms are not improving or worsening he should follow-up in clinic.  May consider orthopedics referral or evaluation may also consider ultrasound.    Etiology of the cheek swelling is unclear.  Does not seem to be an allergic response of any sort.  Also does not appear to be an infectious source.  Certainly considered cranial nerve #5 but he has full use of full facial muscles without lack or change or persistent symptoms.  Discussed keeping a journal of when the symptoms occur and associated symptoms that may be present.  Follow-up if symptoms are worsening or becoming more persistent.  He is in agreement this plan.      Subjective:      Emmett Infante is a 28 y.o. male who presents for a couple of concerns today. First concern is of pain above the right elbow with flexion of his bicep with curls and pull-ups.   No numbness or tingling. Pain is a sharp and stabbing and is only present with exercise. He has massaged this and has helped.   His main concern is regarding his left cheek. He has noticed left cheek swelling the last one year. He has had this come and go without known pattern or aggravating factors. No pain is present with it is swollen. He will wake up in the morning with the swelling then by mid afternoon the swelling reduces. He shows me a picture and it reveals significant swelling. Hasn't noticed any lack of movement but it feels tighter when it occurs. No history of abscessed. He will have a \"weird sensation\" over his lower jaw near his mouth but this quickly resolves. The sensation is only present when he " "has swelling on his cheek. Right cheek has not even gotten swollen No other associated symptoms have been present. Has nasal congestion that is present persistently. Some seasonal allergy symptoms. Denies sinus pain or tenderness.     The following portions of the patient's history were reviewed and updated as appropriate: allergies, current medications and problem list.    Patient Active Problem List   Diagnosis     Patent Foramen Ovale     Acne     Nail fungus     Raynaud's phenomenon without gangrene       Current Outpatient Medications   Medication Sig     creatinine, bulk, 100 % Powd Use As Directed.     MULTIVIT &MINERALS/FERROUS FUM (MULTI VITAMIN ORAL) Take by mouth.     WHEY PROTEIN CONCEN/AMINO ACID (WHEY PROTEIN CONCENTRATE ORAL) Take by mouth.     terbinafine 1 % Gel Apply twice daily for 345 days.       Review of Systems   A 12 point comprehensive review of systems was negative except as noted.      Objective:      /80   Pulse 68   Resp 16   Ht 5' 9.5\" (1.765 m)   Wt 218 lb (98.9 kg)   BMI 31.73 kg/m      General Appearance: Alert, cooperative, appears slightly fatigued.  Head: Normocephalic, without obvious abnormality, atraumatic.  Eyes: PERRL, conjunctiva/corneas clear, EOM's intact.  Nose: Nares congested.  Throat: Slightly erythematous. No exudates.  No significant pupil swelling present.  No acute infection noted.  No abscessed teeth.  Dentition is within normal limits.  Neck: Supple, symmetrical, trachea midline, no adenopathy.  Heart : normal rate, regular rhythm, normal S1, S2, no murmurs, rubs, clicks or gallops.  Lungs: Clear to auscultation bilaterally, respirations unlabored.  Extremities: Extremities normal, atraumatic, no cyanosis or edema.  Strength equal bilaterally.  Normal muscle tone bilaterally.  Unable to fully elicit pain on exam.  Lymph nodes: Cervical, supraclavicular, and axillary nodes normal.  Neuro: Grossly Normal Cranial Nerve II-VI WNL    No results found for " this or any previous visit (from the past 168 hour(s)).      Monika Strong, CNP  1:47 PM

## 2021-05-31 VITALS — BODY MASS INDEX: 30.48 KG/M2 | WEIGHT: 205.8 LBS | HEIGHT: 69 IN

## 2021-05-31 VITALS — WEIGHT: 207 LBS | BODY MASS INDEX: 30.57 KG/M2

## 2021-05-31 NOTE — PROGRESS NOTES
Assessment/ Plan:       1. Ingrown hair  Axillary cyst appears to be an ingrown hair could be a slight small sebaceous cyst.  Advised patient to continue monitor site and to apply warm compress to it.  Discouraged picking or squeezing the site.  If it is worsening advised patient to follow-up via my chart and I can send in an antibiotic to help treat the site.  If it does open up would recommend patient to allow it to drain if redness and pain or fevers occur would recommend an antibiotic at that time.  He is in agreement this plan.  If it is enlarging and becoming more acutely painful and he wants it to be evaluated this would also be appropriate.  Discussed with patient that it may require an I&D if that occurs.    2. Chronic pain of right knee  Chronic pain in the right knee likely tendinitis secondary to the missing aspect of his meniscus.  Continue with symptomatic management and avoid activities that worsen symptoms.  Certainly could refer to orthopedics versus getting an MRI to evaluate further if needed.  Initially he would require an x-ray.  He declines this at this time.  Patient is in agreement with this plan.        Subjective:      Emmett Infante is a 28 y.o. male who presents for concerns of a lump under his right armpit. It was noticed 8 days ago, it was painful for those 2-3 days. It was large and red as well. It is now no longer painful or red.  He reports he was going to cancel the appointment but figured he should come and evaluated just in case.  He also mentions some decreased range of motion of his right knee.  He reports that he has part of his meniscus missing from his knee he denies any chronic pain at this but will pain will occur when he is having a lot of activity.  No other concerns regarding this.  Dee is feeling well.  He denies any chest pain, shortness of breath, or difficulty breathing.    The following portions of the patient's history were reviewed and updated as appropriate:  "allergies, current medications and problem list.    Patient Active Problem List   Diagnosis     Patent Foramen Ovale     Acne     Nail fungus     Raynaud's phenomenon without gangrene       Current Outpatient Medications   Medication Sig     creatinine, bulk, 100 % Powd Use As Directed.     MULTIVIT &MINERALS/FERROUS FUM (MULTI VITAMIN ORAL) Take by mouth.     WHEY PROTEIN CONCEN/AMINO ACID (WHEY PROTEIN CONCENTRATE ORAL) Take by mouth.       Review of Systems   A 12 point comprehensive review of systems was negative except as noted.      Objective:      /70   Pulse 60   Resp 16   Ht 5' 9.6\" (1.768 m)   Wt 213 lb (96.6 kg)   BMI 30.91 kg/m      General appearance: alert, appears stated age and cooperative  Head: Normocephalic, without obvious abnormality, atraumatic  Lungs: clear to auscultation bilaterally  Heart: regular rate and rhythm, S1, S2 normal, no murmur, click, rub or gallop  Extremities: extremities normal, atraumatic, no cyanosis or edema.  Mildly decreased range of motion of the right knee.  No acute knee pain on exam anterior and posterior drawer negative.  Varus and valgus testing is negative.  Skin: Small 5 mm cyst like lesion present in his right axilla red/purple in color beneath the surface but does appear to have a small area of umbilication that would indicate a fluid-filled pocket.  No acute pain on exam.  Mildly she went feeling.  Does not appear to be acutely infected.  Lymph nodes: Cervical, supraclavicular, and axillary nodes normal.  Neurologic: Grossly normal      Monika Strong CNP  4:04 PM  "

## 2021-05-31 NOTE — TELEPHONE ENCOUNTER
Triage call:   Sunday small lump/bump on inner arm - got bigger and darker in color on Monday but hasn't grown since then- right arm. Half inch and circular, lump is hard and it doesn't really move- doesn't hurt unless pressure is applied. Denies fever or itching.     Triaged to be seen within the next 3 days- reviewed additional care advice and patient verbalizes understanding. Patient warm transferred to scheduling for appointment. Appointment scheduled for Monday @ 4 pm with PCP.     Latonia Amaya RN [facility] Care Connection Triage/Med Refill 8/1/2019 12:17 PM    Reason for Disposition    [1] Small swelling or lump AND [2] unexplained AND [3] present > 1 week    Protocols used: SKIN LUMP OR LOCALIZED SWELLING-A-AH

## 2021-06-01 VITALS — WEIGHT: 203 LBS | HEIGHT: 70 IN | BODY MASS INDEX: 29.06 KG/M2

## 2021-06-02 ENCOUNTER — RECORDS - HEALTHEAST (OUTPATIENT)
Dept: ADMINISTRATIVE | Facility: CLINIC | Age: 31
End: 2021-06-02

## 2021-06-02 VITALS — BODY MASS INDEX: 31.07 KG/M2 | HEIGHT: 70 IN | WEIGHT: 217 LBS

## 2021-06-03 ENCOUNTER — RECORDS - HEALTHEAST (OUTPATIENT)
Dept: ADMINISTRATIVE | Facility: CLINIC | Age: 31
End: 2021-06-03

## 2021-06-03 VITALS — WEIGHT: 218 LBS | HEIGHT: 70 IN | BODY MASS INDEX: 31.21 KG/M2

## 2021-06-03 VITALS — WEIGHT: 213 LBS | HEIGHT: 70 IN | BODY MASS INDEX: 30.49 KG/M2

## 2021-06-03 NOTE — PROGRESS NOTES
Assessment/ Plan:         1. Right elbow pain     2. Olecranon bursitis of right elbow       Etiology of the right elbow pain is unclear.  I am suspicious that it is likely an olecranon bursitis.  Differential diagnosis included tendinitis versus bony abnormality.  Unlikely to be a bony abnormality as there was no previous injury.  Exam did not reveal any acute tendinitis or pain.  Discussed symptom management with the patient.  Offered x-ray; declined today.  Discussed symptomatic management and provided this in the after visit summary.  If symptoms are not improving or worsening would recommend x-ray and then additional imaging if needed.  He is in agreement with this plan.      Subjective:      Emmett Infante is a 28 y.o. male who presents for right elbow pain. No known injury. He recalls resting his elbow [place]  and he tried adjusting positioning. It is worsening and coming and going. Pain is described as a dull ache and will come and go and only be with certain positions/ events. Last night it hurt when his elbow was touching a blanket on his bed. When he straightens his arm he can feel it as well. No change in appearance. No swelling, redness, bruising. He typically lifts weights but have not for about 6 weeks. Pain started after he eased off working out. He decreased his working out and lifting due to time constraints. He denies any sharp, shooting pain. Described as a post injury ache without the injury.    No other muscle or joint pain is present. No other questions or concerns today.     The following portions of the patient's history were reviewed and updated as appropriate: allergies, current medications and problem list.    Patient Active Problem List   Diagnosis     Patent Foramen Ovale     Acne     Nail fungus     Raynaud's phenomenon without gangrene       Current Outpatient Medications   Medication Sig     creatinine, bulk, 100 % Powd Use As Directed.     MULTIVIT  "&MINERALS/FERROUS FUM (MULTI VITAMIN ORAL) Take by mouth.     WHEY PROTEIN CONCEN/AMINO ACID (WHEY PROTEIN CONCENTRATE ORAL) Take by mouth.       Review of Systems   A 12 point comprehensive review of systems was negative except as noted.      Objective:      /78   Pulse 60   Resp 16   Ht 5' 9.6\" (1.768 m)   Wt 215 lb (97.5 kg)   BMI 31.20 kg/m      General appearance: alert, appears stated age and cooperative  Head: Normocephalic, without obvious abnormality, atraumatic  Lungs: clear to auscultation bilaterally  Heart: regular rate and rhythm, S1, S2 normal, no murmur, click, rub or gallop  Extremities: extremities normal, atraumatic, no cyanosis or edema and right elbow: Normal ROM, no swelling, erythremia, bruising, or tenderness to palpation is present.  Skin: Skin color, texture, turgor normal. No rashes or lesions  Neurologic: Grossly normal      Monika Strong CNP  11:38 AM  "

## 2021-06-04 VITALS
SYSTOLIC BLOOD PRESSURE: 104 MMHG | HEIGHT: 70 IN | DIASTOLIC BLOOD PRESSURE: 78 MMHG | RESPIRATION RATE: 16 BRPM | HEART RATE: 60 BPM | BODY MASS INDEX: 30.78 KG/M2 | WEIGHT: 215 LBS

## 2021-06-05 VITALS
BODY MASS INDEX: 33.09 KG/M2 | SYSTOLIC BLOOD PRESSURE: 130 MMHG | WEIGHT: 228 LBS | RESPIRATION RATE: 18 BRPM | OXYGEN SATURATION: 100 % | HEART RATE: 60 BPM | DIASTOLIC BLOOD PRESSURE: 72 MMHG

## 2021-06-05 VITALS
SYSTOLIC BLOOD PRESSURE: 112 MMHG | TEMPERATURE: 97.9 F | DIASTOLIC BLOOD PRESSURE: 84 MMHG | OXYGEN SATURATION: 98 % | HEART RATE: 70 BPM | WEIGHT: 219 LBS | BODY MASS INDEX: 31.79 KG/M2

## 2021-06-12 NOTE — PROGRESS NOTES
"  Assessment/ Plan:         1. Feeling of chest tightness  Holter Monitor    Comprehensive Metabolic Panel    HM2(CBC w/o Differential)   2. Gastroesophageal reflux disease without esophagitis  Comprehensive Metabolic Panel    HM2(CBC w/o Differential)   3. Muscle tightness       I suspect that his chest tightness feeling may be related to his reflux symptoms.  However given his cardiac work-up in the past and his mildly enlarged atria I think it would be worthwhile evaluating further with a Holter monitor.  Labs will be collected as above.  At the time of this dictation his CBC is returned and he is not anemic.  I advised patient to monitor symptoms and to monitor symptoms of his reflux as well.  I discussed etiology of reflux with the patient today and advised patient to trial antacid medications and acid reducing food in his diet.    His concern regarding his neck and upper back is likely related to muscle tightness and poor posture.  On exam he did agree that this was likely the cause.  I encouraged him to return to regular cardiovascular exercise as tolerated.    In general symptoms are failing to improve or not improving I would recommend following up in clinic for reevaluation.  I will monitor for Holter monitor results.      Subjective:      Emmett Infante is a 29 y.o. male who presents for follow up from [facility] 1 weeks ago. He reports that he was worked up for heart burn and palpitations. He endorses tightness in his chest with lightheadedness. Symptoms started about 3 weeks ago intermittently. He reports that the \"episodes\" will last 10-15 seconds to no more than 1 min. Symptoms are typically the shorter range. He will have a sensation of the heart beats \"harder\". He will have symptoms at minimum daily. He denies any chest pain with the tightness, but this is all over the left side. He is not aware of any family history of heart disease. He reports that both of his grand fathers had heart attacks in their 60s " (he thinks). He reports having a cardiac workup around 2012 which ruled out a PFO.  Cardiac workup at that time was normal. Only abnormality was mild enlargement of his right atria.  He has had increased symptoms of heart burn recently. He is not sure if this is causing the heart symptoms. He is drinking a whey protien shake. He is no longer taking the creatine. He denies any other supplement use.      He reports that he also has a bone swelling at the base of his neck. He reports that it feels larger than it previously did. Or that he just hadn't noticed this previously    The heartburn seems to change with different foods. He reports that the heartburn incidence seemed to increase and possibly correlate with onset with the heart symptoms.     He has not been exercising and is feeling decreased cardiovascular endurance. He does not feel significant anxiety.       The following portions of the patient's history were reviewed and updated as appropriate: allergies, current medications and problem list.    Patient Active Problem List   Diagnosis     Patent Foramen Ovale     Acne     Nail fungus     Raynaud's phenomenon without gangrene       Current Outpatient Medications   Medication Sig     MULTIVIT &MINERALS/FERROUS FUM (MULTI VITAMIN ORAL) Take by mouth.     WHEY PROTEIN CONCEN/AMINO ACID (WHEY PROTEIN CONCENTRATE ORAL) Take by mouth.     omeprazole (PRILOSEC) 20 MG capsule Take 1 capsule (20 mg total) by mouth daily before breakfast.       Review of Systems   A 12 point comprehensive review of systems was negative except as noted.      Objective:      /84 (Patient Site: Right Arm, Patient Position: Sitting, Cuff Size: Adult Regular) Comment (Cuff Size): long cuff  Pulse 70   Temp 97.9  F (36.6  C) (Oral)   Wt 219 lb (99.3 kg)   SpO2 98%   BMI 31.79 kg/m      General appearance: alert, appears stated age and cooperative  Head: Normocephalic, without obvious abnormality, atraumatic  Lungs: clear to  auscultation bilaterally  Heart: regular rate and rhythm, S1, S2 normal, no murmur, click, rub or gallop  Neurologic: Grossly normal    I personally spent 25 minutes spent together with the patient today, more than 50% spent in counseling, discussing the above topics.      Results for orders placed or performed in visit on 10/23/20   HM2(CBC w/o Differential)   Result Value Ref Range    WBC 5.2 4.0 - 11.0 thou/uL    RBC 5.91 4.40 - 6.20 mill/uL    Hemoglobin 16.4 14.0 - 18.0 g/dL    Hematocrit 49.2 40.0 - 54.0 %    MCV 83 80 - 100 fL    MCH 27.8 27.0 - 34.0 pg    MCHC 33.3 32.0 - 36.0 g/dL    RDW 14.5 11.0 - 14.5 %    Platelets 159 140 - 440 thou/uL    MPV 10.0 7.0 - 10.0 fL         Monika Strong, CNP  10:01 AM

## 2021-06-12 NOTE — PROGRESS NOTES
SUBJECTIVE:   Emmett Infante is a 29 y.o. male presenting with a chief complaint of   Chief Complaint   Patient presents with     Mass     Check for cyst, posterior neck     Heartburn     Intermittent x3 years, recently getting worse     Patient presents for evaluation heartburn that has been intermittent for the past 3 years. The heartburn has been occurring more frequently, almost daily. He has been eating spicy foods and dairy which worsen the heartburn. Milk usually helps his heartburn, but sometimes does not. He has tried tums for the heartburn which helps temporarily. 1 tab will help after eating dairy, but he needs 2 tabs with spicy foods.     Also, he states in college he had heart palpitations and he has seen cardiology who say he has a heart arhythmia. Sometimes accompanied by dizziness and shortness of breath. Breathing calmly helps his symptoms. Intensity is moderate and denies any symptoms currently though it has been happening more often lately. Associated symptoms include chest tightness, dizziness. Occurs sometimes daily, sometimes none, sometimes twice daily. Has been worsening over the past 2 weeks. He has had an ultrasound of his heart along with stress test which he thinks was normal. He is unsure if he has worn a holter monitor. He states when the palpitations occur he does check his heart rate and it doesn't feel fast. Pain is reproducible in left chest. Denies recent increase in physical activity. He has been under more stress at work and [place] by eating more. Denies history of stroke or MI.    Denies shortness of breath, cough, weakness, nausea, fever, chills, dizziness currently, but states he is having left-sided chest pressure that does not radiate.     Problem list, Medication list, Allergies, and Medical history reviewed in EPIC.    ROS:  Review of systems negative except for noted above    OBJECTIVE  /79   Pulse 65   Temp 97.9  F (36.6  C) (Oral)   Resp 18   SpO2 100%      Physical Exam  Constitutional:       General: He is not in acute distress.     Appearance: He is not ill-appearing, toxic-appearing or diaphoretic.   HENT:      Head: Normocephalic and atraumatic.      Nose: Nose normal. No congestion or rhinorrhea.      Mouth/Throat:      Mouth: Mucous membranes are moist.      Pharynx: Oropharynx is clear. No oropharyngeal exudate or posterior oropharyngeal erythema.   Eyes:      General:         Right eye: No discharge.         Left eye: No discharge.      Extraocular Movements: Extraocular movements intact.      Pupils: Pupils are equal, round, and reactive to light.   Cardiovascular:      Rate and Rhythm: Normal rate and regular rhythm.      Pulses: Normal pulses.      Heart sounds: Normal heart sounds.   Pulmonary:      Effort: Pulmonary effort is normal. No respiratory distress.      Breath sounds: Normal breath sounds. No wheezing, rhonchi or rales.      Comments: Tenderness with palpation of left chest  Chest:      Chest wall: Tenderness present.   Lymphadenopathy:      Cervical: No cervical adenopathy.   Skin:     General: Skin is warm and dry.   Neurological:      General: No focal deficit present.      Mental Status: He is oriented to person, place, and time.      Cranial Nerves: No cranial nerve deficit.      Sensory: No sensory deficit.      Motor: No weakness.      Coordination: Coordination normal.      Gait: Gait normal.      Deep Tendon Reflexes: Reflexes normal.       EKG completed during visit showing heart rate of 62, normal sinus rhythm with left axis deviation. No previous EKG available for comparison.     ASSESSMENT:      ICD-10-CM    1. Heartburn  R12 omeprazole (PRILOSEC) 20 MG capsule   2. Chest tightness  R07.89 Electrocardiogram Perform and Read      PLAN:    For heartburn recommended avoiding spicy foods. Prescription sent to pharmacy for omeprazole 20 mg daily. Eat more frequent, small meals, avoid eating before bedtime.    Reassured patient with  normal exam findings. For chest tightness and heart palpitations, EKG completed during visit showing no acute cause for symptoms. Recommended deep breathing exercises, gentle exercise like walking to help manage stress. Go to emergency room with increasing chest tightness, heart palpitations with dizziness or shortness of breath.     Follow-up with PCP for further evaluation within 5 days, and sooner if symptoms worsen or new symptoms develop.     Discussed red flag symptoms which warrant immediate visit in emergency room    All questions were answered and patient verbalized understanding. AVS reviewed with patient.     Opal Garcia, SONIDO, APRN, CNP 10/16/2020 10:51 AM

## 2021-06-13 NOTE — TELEPHONE ENCOUNTER
Wellness Screening Tool  Symptom Screening:  Do you have one of the following NEW symptoms:    Fever (subjective or >100.0)?  No    A new cough?  No    Shortness of breath?  No     Chills? No     New loss of taste or smell? No     Generalized body aches? No     New persistent headache? No     New sore throat? No     Nausea, vomiting, or diarrhea?  No    Within the past 2 weeks, have you been exposed to someone with a known positive illness below:    COVID-19 (known or suspected)?  No    Chicken pox?  No    Mealses?  No    Pertussis?  No    Patient notified of visitor policy- No visitors are allowed to accompany the patient at this time. Yes  Pt informed to wear a mask: Yes  Pt notified if they develop any symptoms listed above, prior to their appointment, they are to call the clinic directly at [phone] for further instructions.  Yes  Patient's appointment status: Patient will be seen in clinic as scheduled on 12/14

## 2021-06-13 NOTE — PROGRESS NOTES
"  Assessment/Plan:       1. Nasal sore  Nasal passage sore on the right side appears to be impetigo with yellow crusting.  I will treat the area with Bactroban ointment.  I discussed to use this 3 times daily for 1 week.  Following up if symptoms are not improving or worsening.  I anticipate that with the treatment of the antibiotic symptoms will improve.  I also discussed using saline nasal sprays as well as petroleum jelly to provide a barrier.  I encouraged him to continue using his humidifier.  Plan following up otherwise as needed or if symptoms are not improving as discussed.  - mupirocin (BACTROBAN) 2 % ointment; Apply to affected area 3 times daily  Dispense: 15 g; Refill: 0        Subjective:      Emmett Infante is a 26 y.o. male who presents for concerns of a sinus infection. Symptoms started with a cold around 10/8 through 10/14 with nasal congestion and allergy symptoms. He has since started to have bloody nose/ discharge on 10/15. He otherwise has been feeling well. He did notice an swollen lymph node on his right side. He has not tried any nasal sprays or washes. He did start using a humidifier which has helped. He has not been taking any medications to treat symptoms. He has used ibuprofen for pain but not consistently.  He reports he feels like there is a sore on the inside of his right nares.  He denies any other questions or concerns today.  He reports otherwise he is feeling well.    The following portions of the patient's history were reviewed and updated as appropriate: allergies, current medications and problem list.    Review of Systems   Pertinent items are noted in HPI.      Objective:      /84 (Patient Site: Left Arm, Patient Position: Sitting, Cuff Size: Adult Large)  Pulse 68  Resp 16  Ht 5' 9\" (1.753 m)  Wt 205 lb 12.8 oz (93.4 kg)  BMI 30.39 kg/m2    General Appearance: Alert, cooperative, appears slightly fatigued.  Head: Normocephalic, without obvious abnormality, " atraumatic.  Eyes: PERRL, conjunctiva/corneas clear, EOM's intact.  Ears: Normal TM's and external ear canals, both ears.  Nose: Nares congested.  Right nares has open sore with yellow crusting over the wound. Two sores are present in his nares.   Throat: Slightly erythematous. No exudates. No tonsillar hypertrophy.   Neck: Supple, symmetrical, trachea midline, no adenopathy.  Heart : normal rate, regular rhythm, normal S1, S2, no murmurs, rubs, clicks or gallops.  Lungs: Clear to auscultation bilaterally, respirations unlabored.  Extremities: Extremities normal, atraumatic, no cyanosis or edema.  Lymph nodes: Cervical, supraclavicular, and axillary nodes normal.

## 2021-06-13 NOTE — PATIENT INSTRUCTIONS - HE
1. We will schedule a stress test to see if there are any abnormalities with your heart when you exercise.  2. If the stress test is normal and you are still having symptoms, we can also set you up a heart monitor of longer duration.   3. No need for follow-up in clinic, unless your test looks abnormal or you would like to be seen again. We will contact you when we have test results.

## 2021-06-14 NOTE — TELEPHONE ENCOUNTER
Immunization information.    Additional Information    Health Information question, no triage required and triager able to answer question    Protocols used: INFORMATION ONLY CALL-A-AH

## 2021-06-15 NOTE — PROGRESS NOTES
Assessment/Plan:       1. Nail fungus  Nail fungus present on third toe left foot throughout entire nail.  I recommend that he continue with the over-the-counter treatment as he had success with this previously.  I discussed best treatment remedies including using Vicks vapor rub and the over-the-counter treatment on a daily basis for at minimum of 48 weeks or until the nail completely grows out then continuing it for several more weeks.  I discussed the etiology of the nail fungus and how best it gets treated.  He will let me know if he wants to try a prescription based medication on ongoing treatment for this.    2. Nodule of groin  Nodule of the groin unclear if this is a reactive lymph node.  I discussed with patient to monitor this site and to return to the clinic if this enlarges.  I anticipate that it would go away without any treatment.  However if it becomes bothersome such as painful, inflamed, or large ending I would recommend further evaluation of this including ultrasound and perhaps blood work.        Subjective:      Emmett Infante is a 27 y.o. male who presents for concerns of toe nail fungus and a lump in his groin. Nail fungus has been present on the third toe right foot for about 1 year. He has tried OTC treatment and Vicks vapor rub which did improve but he stopped treatment and the symptoms had returned.  Symptoms are now back to where they were previously to when he was doing home treatments.  And secondly he noticed a firm nonpainful nodule in his left side of his groin about 3 days ago.  It is the size of a pea and closer to the surface near his hip flexor.  He has no concerns for STDs and denies any changes to his penis or testicles.  He has not had any recent illnesses that he is aware of.    The following portions of the patient's history were reviewed and updated as appropriate: allergies, current medications and problem list.    Review of Systems   Pertinent items are noted in HPI.       Objective:      /80 (Patient Site: Left Arm, Patient Position: Sitting, Cuff Size: Adult Large)  Pulse 60  Resp 16  Wt 207 lb (93.9 kg)  BMI 30.57 kg/m2    General appearance: alert, appears stated age and cooperative  Head: Normocephalic, without obvious abnormality, atraumatic  Lungs: clear to auscultation bilaterally  Heart: regular rate and rhythm, S1, S2 normal, no murmur, click, rub or gallop  Extremities: extremities normal, atraumatic, no cyanosis or edema  Male genitalia: normal, penis: no lesions or discharge. testes: no masses or tenderness. no hernias, Small pea sized firm nodule on right side of groin near anterior hip. No pain to palpation. lesion is non-mobile.   Neurologic: Grossly normal     20 minutes spent together with the patient today, more than 50% spent in counseling, discussing the above topics.

## 2021-06-17 NOTE — PATIENT INSTRUCTIONS - HE
Patient Instructions by Monika Strong CNP at 11/25/2019 11:40 AM     Author: Monika Strong CNP Service: -- Author Type: Nurse Practitioner    Filed: 11/25/2019 11:47 AM Encounter Date: 11/25/2019 Status: Signed    : Monika Strong CNP (Nurse Practitioner)         Patient Education     Bursitis of the Elbow (Olecranon)  Your elbow joint contains a small fluid-filled sac called a bursa. The bursa helps the muscles and tendons move smoothly over the bone. It also cushions and protects your elbow. Bursitis is when the bursa is inflamed or swollen. This is most often due to overuse of or injury to the elbow. Symptoms include swelling and pain. If the elbow is red and feels warm to the touch, the bursa itself may be infected.  In most cases, elbow bursitis resolves with medicine and self-care at home. It may take several weeks for the bursa to heal and the swelling to go away. In some cases, your healthcare provider may drain excess fluid from the bursa. Or, he or she may inject medicine directly into the bursa to help relieve symptoms. In severe cases, you may need surgery to remove the bursa may. If there is concern that the bursa is infected, your healthcare provider may prescribe antibiotics to treat the infection.    Home care  Your healthcare provider may prescribe medicine to help relieve pain and swelling. This may be an over-the-counter pain reliever or prescription pain medicine. Take all medicines as directed. To help treat or prevent infection, your provider may prescribe antibiotics. If these are prescribed, take them as directed until they are gone.  The following are general care guidelines:    Apply an ice pack or bag of frozen peas wrapped in a thin towel to your elbow for 15 to 20 minutes at a time. Do this 3 to 4 times a day until pain and swelling improve.    Keep your elbow raised above the level of your heart whenever possible. This helps reduce swelling. When sitting or  lying down, place your arm on a pillow that rests on your chest or on a pillow at your side.    Use an elastic wrap around the elbow joint to compress the area while it is healing. Make the wrap snug but not tight to the point of causing pain.    Rest your elbow to give it time to heal. You may need to wear an elbow pad to help protect and limit the movement of your elbow. During and after healing, avoid leaning on your elbows.  Follow-up care  Follow up with your healthcare provider, or as advised. If you have been referred to a specialist, make that appointment promptly.  When to seek medical advice  Call your healthcare provider right away if any of these occur:    Fever of 100.4 F (38 C) or higher, or as advised    Chills    Increased pain, swelling, warmth, redness, or drainage from the joint    Trouble moving the elbow joint    Numbness or tingling in the hand    Severe pain or swelling in forearm or hand    Loss of pink color and slow return of color after squeezing fingertip or hand  Date Last Reviewed: 6/1/2016 2000-2017 [employer]. [address] [place] [address]. All rights reserved. This information is not intended as a substitute for professional medical care. Always follow your healthcare professional's instructions.

## 2021-06-17 NOTE — PROGRESS NOTES
Assessment/Plan:       1. Nail fungus  Patient has continued nail fungus despite treatment with tea tree oil.  He had been doing this on a regular basis unfortunately symptoms are not improving.  I opted to start patient on an oral antifungal to see if this will improve his symptoms.  I advised patient to take terbinafine 250 mg once daily for 3 months and to follow-up in clinic to assess progress.  A comprehensive metabolic panel will be assessed today to assure proper kidney and liver function.  Patient will be notified if there is any abnormalities and advised on medication if needed.  He is in agreement with this plan.  - Comprehensive Metabolic Panel  - terbinafine HCl (LAMISIL) 250 mg tablet; Take 1 tablet (250 mg total) by mouth daily.  Dispense: 90 tablet; Refill: 0    2. Raynaud's phenomenon without gangrene  Based on symptoms and evaluation it sounds as though he has ray nods phenomenon.  I discussed this with the patient and provided him with educational resources regarding this.  I advised patient to do his best to keep his extremities warm and to allow the feet to warm up again slowly instead of using hot water more warm water as tolerated.  Discussed with patient that it is not overly concerning and not uncommon.  However if symptoms are worsening or occurring more frequently or if he is noticing skin breakdown because of the symptoms there is medications that we can give that will help with circulation.  Plan following up if symptoms are worsening/ increasing in frequency, patient is in agreement with this plan.        Subjective:      Emmett Infante is a 27 y.o. male who presents for re-evaluation of right middle toe nail fungus.  He saw me back in December for this concern and I advised him to try the tea tree oil for another 3 months as he had stopped doing it after improvement.  He has not had any improvement with the symptoms however in the last 3 months with doing this.  He is wondering whether  "treatment options there are for this.  He denies any other symptoms or other toenails that are affected.  He also mentions that he will intermittently have his toes on both feet turn white.  Typically occurs when his feet or his toes are cold or exposed to cold air.  He shows me a picture of when this has occurred.  It does not happen on a frequent basis however when it does it seems quite dramatic.  Toes will turn completely white if not the full toe then at minimum half of the toe.  When the symptoms occur he will typically warm them up in the hot water or take a shower.  After a few minutes the symptoms improve and they will turn red and also have the pins and needles sensation.  Shortly then they returned to be normal.  He denies any skin breakdown or skin changes due to the symptoms.  He reports otherwise he is feeling well and has no other questions or concerns today.      The following portions of the patient's history were reviewed and updated as appropriate: allergies, current medications and problem list.    Patient Active Problem List   Diagnosis     Patent Foramen Ovale     Acne     Nail fungus     Raynaud's phenomenon without gangrene     Current Outpatient Prescriptions   Medication Sig     creatinine, bulk, 100 % Powd Use As Directed.     MULTIVIT &MINERALS/FERROUS FUM (MULTI VITAMIN ORAL) Take by mouth.     WHEY PROTEIN CONCEN/AMINO ACID (WHEY PROTEIN CONCENTRATE ORAL) Take by mouth.     terbinafine HCl (LAMISIL) 250 mg tablet Take 1 tablet (250 mg total) by mouth daily.         Review of Systems   Pertinent items are noted in HPI.      Objective:      /78  Pulse 68  Resp 16  Ht 5' 9.5\" (1.765 m)  Wt 203 lb (92.1 kg)  BMI 29.55 kg/m2    General appearance: alert, appears stated age and cooperative  Head: Normocephalic, without obvious abnormality, atraumatic  Lungs: clear to auscultation bilaterally  Heart: regular rate and rhythm, S1, S2 normal, no murmur, click, rub or gallop  Abdomen: " soft, non-tender; bowel sounds normal; no masses,  no organomegaly  Extremities: extremities normal, atraumatic, no cyanosis or edema  Skin: Skin color, texture, turgor normal. No rashes or lesions or Right third toenail is thickened and flaking as well as yellow.  Neurologic: Grossly normal

## 2021-06-17 NOTE — PATIENT INSTRUCTIONS - HE
Patient Instructions by Monika Strong CNP at 1/11/2019  9:00 AM     Author: Monika Strong CNP Service: -- Author Type: Nurse Practitioner    Filed: 1/11/2019  9:11 AM Encounter Date: 1/11/2019 Status: Addendum    : Monika Strong CNP (Nurse Practitioner)    Related Notes: Original Note by Monika Strong CNP (Nurse Practitioner) filed at 1/11/2019  9:11 AM       Wash with cholorahexadine 2-3 days per week.       Patient Education     Nail Fungal Infection  A nail fungal infection changes the way fingernails and toenails look. They may thicken, discolor, change shape, or split. This condition is hard to treat because nails grow slowly and have limited blood supply. The infection often comes back after treatment.  There are 2 types of medicines used to treat this condition:    Topical anti-fungal medicines. These are applied to the surface of the skin and nail area. These medicines are not very effective because they cant get deep into the nail.    Oral antifungal medicines. These medicines work better because they go into the nail from the inside out. But the infection may still come back. It may take 9 to 12 months for your nail to look normal again. This means you are cured. You can repeat treatment if needed. Most people take these medicines without any problems. It is rare to stop therapy because of side effects. But your healthcare provider may give you some monitoring tests. Talk about possible side effects with your provider before starting treatment.  If medicines fail, the nail can be removed surgically or chemically. These methods physically remove the fungus from the body. This helps medical treatment be more effective.  Home care    Use medicines exactly as directed for as long as directed. Treating a fungal infection can take longer than other kinds of infections.    Smoking is a risk factor for fungal infection. This is one more reason to quit.    Wear absorbent socks,  and shoes that let your feet breathe. Sweaty feet increase your risk of fungal infection. They also make an existing infection harder to treat.    Use footwear when in damp public places like swimming pools, gyms, and shower rooms. This will help you avoid the fungus that grows there.    Don't share nail clippers or scissors with others.  Follow-up care  Follow up with your healthcare provider, or as advised.  When to seek medical advice  Call your healthcare provider right away if any of these occur:    Skin by the nail becomes red, swollen, painful, or drains pus (a creamy yellow or white liquid)    Side effects from oral anti-fungal medicines  Date Last Reviewed: 8/1/2016 2000-2017 [employer]. [address] [place] [address], [place] [address]. All rights reserved. This information is not intended as a substitute for professional medical care. Always follow your healthcare professional's instructions.

## 2021-06-18 NOTE — PATIENT INSTRUCTIONS - HE
Patient Instructions by Opal Garcia NP at 10/16/2020  9:50 AM     Author: Opal Garcia NP Service: -- Author Type: Nurse Practitioner    Filed: 10/16/2020 10:41 AM Encounter Date: 10/16/2020 Status: Signed    : Opal Garcia NP (Nurse Practitioner)         Patient Education     GERD (Adult)    The esophagus is a tube that carries food from the mouth to the stomach. A valve at the lower end of the esophagus prevents stomach acid from flowing upward. When this valve doesn't work properly, stomach contents may repeatedly flow back up (reflux) into the esophagus. This is called gastroesophageal reflux disease (GERD). GERD can irritate the esophagus. It can cause problems with swallowing or breathing. In severe cases, GERD can cause recurrent pneumonia or other serious problems.  Symptoms of reflux include burning, pressure or sharp pain in the upper abdomen or mid to lower chest. The pain can spread to the neck, back, or shoulder. There may be belching, an acid taste in the back of the throat, chronic cough, or sore throat or hoarseness. GERD symptoms often occur during the day after a big meal. They can also occur at night when lying down.   Home care  Lifestyle changes can help reduce symptoms. If needed, medicines may be prescribed. Symptoms often improve with treatment, but if treatment is stopped, the symptoms often return after a few months. So most persons with GERD will need to continue treatment.  Lifestyle changes    Limit or avoid fatty, fried, and spicy foods, as well as coffee, chocolate, mint, and foods with high acid content such as tomatoes and citrus fruit and juices (orange, grapefruit, lemon).    Dont eat large meals, especially at night. Frequent, smaller meals are best. Do not lie down right after eating. And dont eat anything 3 hours before going to bed.    Avoid drinking alcohol and smoking. As much as possible, stay away from second hand smoke.    If you are overweight,  "losing weight will reduce symptoms.     Avoid wearing tight clothing around your stomach area.    If your symptoms occur during sleep, use a foam wedge to elevate your upper body (not just your head.) Or, place 4\" blocks under the head of your bed.  Medicines  If needed, medicines can help relieve the symptoms of GERD and prevent damage to the esophagus. Discuss a medicine plan with your healthcare provider. This may include one or more of the following medicines:    Antacids to help neutralize the normal acids in your stomach.    Acid blockers (H2 blockers) to decrease acid production.    Acid inhibitors (PPIs) to decrease acid production in a different way than the blockers. They may work better, but can take a little longer to take effect.  Take an antacid 30-60 minutes after eating and at bedtime, but not at the same time as an acid blocker.  Try not to take medicines such as ibuprofen and aspirin. If you are taking aspirin for your heart or other medical reasons, talk to your healthcare provider about stopping it.  Follow-up care  Follow up with your healthcare provider or as advised by our staff.  When to seek medical advice  Call your healthcare provider if any of the following occur:    Stomach pain gets worse or moves to the lower right abdomen (appendix area)    Chest pain appears or gets worse, or spreads to the back, neck, shoulder, or arm    Frequent vomiting (cant keep down liquids)    Blood in the stool or vomit (red or black in color)    Feeling weak or dizzy    Fever of 100.4 F (38 C) or higher, or as directed by your healthcare provider  Date Last Reviewed: 6/23/2015 2000-2017 [employer]. [address] [place] [address], [place] [address]. All rights reserved. This information is not intended as a substitute for professional medical care. Always follow your healthcare professional's instructions.           Patient Education     Understanding Heart Palpitations    Heart palpitations are the " feeling you have when your heartbeat seems to be racing, pounding, skipping, or fluttering. Heart palpitations are most often felt in the chest. Sometimes, they may also be felt in the neck.   What causes heart palpitations?  In most cases, heart palpitations are caused by:    Stress or anxiety    Exercise    Pregnancy    Some medicines    Caffeine    Nicotine    Alcohol    Illegal drugs, such as cocaine    Health problems, such as anemia or overactive thyroid  Many heart palpitations are harmless. But in some cases, palpitations may be caused by a problem with the heart such as an abnormal heart rhythm (arrhythmia). They may need to be managed by you and your healthcare provider or treated right away.  How are heart palpitations treated?  Treatments for heart palpitations depend on the cause. Options may include:    Managing the things that trigger your heart palpitations. This could mean:  ? Learning ways to reduce stress and anxiety  ? Staying away from caffeine, nicotine, alcohol, and illegal drugs  ? Stopping the use of certain medicines, under your doctors guidance    Medicines, procedures, or surgery to treat an arrhythmia or other health problem that is causing your symptoms  What are possible complications of heart palpitations?  Complications of heart palpitations are rare unless they are caused by a problem such as an arrhythmia. In such cases, complications can include:    Fainting    Heart failure. This problem occurs when the heart is so weak it no longer pumps blood well.    Blood clots and stroke    Sudden cardiac arrest. This problem occurs when the heart suddenly stops beating.  When should I call my healthcare provider?  Call your healthcare provider right away if you have any of these:    Palpitations that prevent you from sleeping or otherwise affect your quality of life.    Symptoms that dont get better with treatment, or symptoms that get worse    New symptoms, such as chest pain, shortness of  breath, dizziness, or fainting  Date Last Reviewed: 5/1/2016 2000-2019 [employer]. [address] [place] [address], [place] [address]. All rights reserved. This information is not intended as a substitute for professional medical care. Always follow your healthcare professional's instructions.

## 2021-06-23 NOTE — PROGRESS NOTES
Assessment/Plan:       1. Onychomycosis  I recommended doing topical treatment of the nail fungus.  I discussed completing this for nearly 1 full year twice daily.  I discussed nail fungus as well as provided patient with handout on recommended treatment for this.  Discussed the difficulty with treatment and the complete resolution.  Could certainly consider oral therapy however would prefer patient to start with topical treatments.  If he fails terbinafine would recommend trialing Jublia.  - terbinafine 1 % Gel; Apply twice daily for 345 days.  Dispense: 12 g; Refill: 6    2. Rash  At this time rash appears to be resolving on its own.  Continue utilizing lotion and applying hydrocortisone cream if itchy.  Did recommend chlorhexidine to be applied 2-3 times a week as a soap when showering to help cleanse the skin and decrease infection risk.  I also discussed with patient avoiding communicable diseases when at the gym including cleaning mats off that are being used.    3. Fibroma  Lesion on torso seems to be a fibroma.  Likely a benign lesion in the skin would recommend monitoring for now no specific treatment.  If it is changing or getting larger or becoming more painful would recommend at that time further follow-up and evaluation by ultrasound.        Subjective:      Emmett Infante is a 28 y.o. male who presents for concerns of ongoing nail fungus. He has not had success with topical regimens that were purchased OTC. He did not start the oral medication that was prescribed. He has done Vicks applied to this as well. It is still on the 3rd toe right foot.  He has not done any significant treatment for the last 1 year.  No pain is associated with this.  He also mentions a rash in his mid low back.  It started approximately 2 weeks ago.  It is starting to improve.  He has not applied any treatment to this.  He first noticed this after going back to the gym at the beginning of the new year.  He does have acne on  "his back but this is different.  It was raised but now it seems to be flat.  It does not bleed or ooze.  And finally he has a bump on the side of his torso that was first noticed approximately 2-3 years ago.  He is not always able to find this.  However when he does find it and if it is squeezed it is mildly painful.  He denies any change in skin texture externally.  It has not presented on the external side of his skin.  No signs of a pimple or blister from this.  No skin color change has been noted.  He reports the size of this is approximately the size of a small P.  No other questions or concerns today.  Reports overall is doing well.       The following portions of the patient's history were reviewed and updated as appropriate: allergies, current medications and problem list.    Review of Systems   Pertinent items are noted in HPI.      Objective:      /78   Pulse 60   Resp 16   Ht 5' 9.5\" (1.765 m)   Wt 217 lb (98.4 kg)   BMI 31.59 kg/m      General appearance: alert, appears stated age and cooperative  Head: Normocephalic, without obvious abnormality, atraumatic  Skin: Third toe right foot thickened nail that is flaking and yellow.  It is three quarters of the nail is affected.  Rash on back is approximately 1 cm in diameter is flat and mildly erythematous and is blanchable.  There is also closed comedones present across upper back.  No signs of spreading.  Rash appears to be healing.  And finally, lesion on right side of torso over rib cage in line with axilla was briefly felt it does feel firm and is mobile consistent with a fibroma.    "

## 2021-06-30 NOTE — PROGRESS NOTES
Progress Notes by Jhon Amaya MD at 12/14/2020  3:30 PM     Author: John Amaya MD Service: -- Author Type: Physician    Filed: 12/14/2020  5:49 PM Encounter Date: 12/14/2020 Status: Signed    : John Amaya MD (Physician)           Thank you, Monika Serrano, CNP, for asking the [facility] team to see Mr. Emmett Infante to evaluate chest pain and palpitations.      Assessment/Recommendations   Assessment:    1. Exertional chest pain. Unclear etiology, possibly musculoskeletal. No risk factors for CAD, although his symptoms do seem to be brought on with exertion and are limiting to him.  2. Dyspnea on exertion.  3. Palpitations. 24-hour Holter fairly unrevealing.   4. Patent foramen ovale. Incidental finding likely of no clinical significance.     Plan:  1. Exercise stress echocardiogram.   2. If stress test is unrevealing and/or palpitations become more severe, can do a prolonged event monitor (7 days).  3. Follow-up as needed.          History of Present Illness   Mr. Emmett Infante is a 30 y.o. male with a history of patent foramen ovale presenting for evaluation of chest pain and palpitations. He has noticed this for the last 1-2 months or so. His symptoms seem most noticeable when is working out, either on the treadmill or using a rowing machine. This can bring on a sensation of pounding in his chest and neck as well and chest discomfort and shortness of breath. His symptoms are not as noticeable when he is not exerting himself. He recently wore a 24-hour Holter monitor on 11/20/2020 which showed only a single dropped P wave, otherwise it was quite normal. He does note he did not do anything very physically active or have symptoms with this. His symptoms are still present although less severe now.     He previously had similar symptoms to this several years ago. He had a TTE done and was diagnosed with a PFO. It also appears he may have had a cardiac MRI  in 2012, although I cannot find the report to this and he does not recall having an MRI. Overall his workup came back fairly normal and he had no symptoms until recently.     Other than noted above, Mr. Infante denies any chest pain/pressure/tightness, shortness of breath at rest or with exertion, light headedness/dizziness, pre-syncope, syncope, lower extremity swelling, palpitations, paroxysmal nocturnal dyspnea (PND), or orthopnea.     Cardiac Problems and Cardiac Diagnostics     Most Recent Cardiac testing:  ECG dated 10/16/2012 (personaly reviewed and interpreted): SR, otherwise normal ECG    ECHO 8/17/2012 (images personally reviewed and interpreted by me):   Normal chamber sizes and biventricular function, no significant valvular disease, faint color jet across the interatrial septum suggestive of PFO    24-hour Holter 11/10/2020:  CONCLUSION:  Probably normal Holter with single blocked ectopic atrial beat  associated with marked sinus arrhythmia at 2:41 p.m.  There was no other evidence  for AV block during the monitoring period and symptoms were associated with sinus  rhythm without ectopy.     Medications  Allergies   Current Outpatient Medications   Medication Sig Dispense Refill   ? fluticasone propionate (FLONASE) 50 mcg/actuation nasal spray 2 sprays into each nostril daily.     ? MULTIVIT &MINERALS/FERROUS FUM (MULTI VITAMIN ORAL) Take by mouth.     ? WHEY PROTEIN CONCEN/AMINO ACID (WHEY PROTEIN CONCENTRATE ORAL) Take by mouth.     ? docosahexaenoic acid-epa 120-180 mg cap Take 2 g by mouth.     ? omeprazole (PRILOSEC) 20 MG capsule Take 1 capsule (20 mg total) by mouth daily before breakfast. 30 capsule 0     No current facility-administered medications for this visit.       Allergies   Allergen Reactions   ? Sulfa (Sulfonamide Antibiotics)         Physical Examination Review of Systems   Vitals:    12/14/20 1523   BP: 130/72   Pulse: 60   Resp: 18   SpO2: 100%     Body mass index is 33.09 kg/m .  Wt  Readings from Last 3 Encounters:   12/14/20 (!) 228 lb (103.4 kg)   10/23/20 219 lb (99.3 kg)   11/25/19 215 lb (97.5 kg)       General Appearance:   Pleasant  male, appears  stated age. no acute distress, normal body habitus   ENT/Mouth: Facemask   EYES:  no scleral icterus, normal conjunctivae   Neck: no carotid bruits. No anterior cervical lymphadenopaty   Respiratory:   lungs are clear to auscultation, no rales or wheezing,  equal chest wall expansion    Cardiovascular:   Regular rhythm,  rate. Normal first and second heart sounds with no murmurs, rubs, or gallops; the carotid, radial and posterior tibial pulses are intact, Jugular venous pressure normal, no edema bilaterally    Abdomen/GI:  no organomegaly, masses, bruits, or tenderness; bowel sounds are present   Extremities: no cyanosis or clubbing   Skin: no xanthelasma, warm.    Heme/lymph/ Immunology No apparent bleeding noted.   Neurologic: Alert and oriented. normal gait, no tremors     Psychiatric: Pleasant, calm, appropriate affect.    A complete 10 system review of systems was performed and is negative except as mentioned in the HPI or below:  General: WNL  Eyes: WNL  Ears/Nose/Throat: WNL  Lungs: WNL  Heart: Chest Pain, Shortness of Breath with activity(PALPITATIONS)  Stomach: Heartburn  Bladder: WNL  Muscle/Joints: WNL  Skin: WNL  Nervous System: WNL  Mental Health: WNL     Blood: WNL       Past History   Past Medical History:   PFO    Past Surgical History:    Family History:   Family History   Problem Relation Age of Onset   ? No Medical Problems Mother    ? No Medical Problems Father    ? Heart attack Maternal Grandfather    ? Heart attack Paternal Grandfather     No history of premature CAD or sudden cardiac death.    Social History:   Social History     Socioeconomic History   ? Marital status: Single     Spouse name: Not on file   ? Number of children: Not on file   ? Years of education: Not on file   ? Highest education level: Not on file    Occupational History   ? Not on file   Social Needs   ? Financial resource strain: Not on file   ? Food insecurity     Worry: Not on file     Inability: Not on file   ? Transportation needs     Medical: Not on file     Non-medical: Not on file   Tobacco Use   ? Smoking status: Never Smoker   ? Smokeless tobacco: Never Used   Substance and Sexual Activity   ? Alcohol use: Yes     Comment: rare   ? Drug use: Not on file   ? Sexual activity: Not on file   Lifestyle   ? Physical activity     Days per week: Not on file     Minutes per session: Not on file   ? Stress: Not on file   Relationships   ? Social connections     Talks on phone: Not on file     Gets together: Not on file     Attends Yarsani service: Not on file     Active member of club or organization: Not on file     Attends meetings of clubs or organizations: Not on file     Relationship status: Not on file   ? Intimate partner violence     Fear of current or ex partner: Not on file     Emotionally abused: Not on file     Physically abused: Not on file     Forced sexual activity: Not on file   Other Topics Concern   ? Not on file   Social History Narrative   ? Not on file         Rare alcohol, no illicit substance use. Works as an  for [employer].      Lab Results    Chemistry/lipid CBC Cardiac Enzymes/BNP/TSH/INR   Lab Results   Component Value Date    CREATININE 1.19 10/23/2020    BUN 18 10/23/2020    K 4.7 10/23/2020     10/23/2020     10/23/2020    CO2 26 10/23/2020    Lab Results   Component Value Date    WBC 5.2 10/23/2020    HGB 16.4 10/23/2020    HCT 49.2 10/23/2020    MCV 83 10/23/2020     10/23/2020         John Amaya MD formerly [facility]  Non-Invasive Cardiologist  [facility]  Pager [phone]

## 2021-08-27 ENCOUNTER — MYC MEDICAL ADVICE (OUTPATIENT)
Dept: FAMILY MEDICINE | Facility: CLINIC | Age: 31
End: 2021-08-27

## 2021-08-27 DIAGNOSIS — D17.30 LIPOMA OF SKIN AND SUBCUTANEOUS TISSUE: Primary | ICD-10-CM

## 2021-09-08 ENCOUNTER — OFFICE VISIT (OUTPATIENT)
Dept: SURGERY | Facility: CLINIC | Age: 31
End: 2021-09-08
Attending: NURSE PRACTITIONER
Payer: COMMERCIAL

## 2021-09-08 VITALS
WEIGHT: 226.08 LBS | HEIGHT: 69 IN | BODY MASS INDEX: 33.49 KG/M2 | SYSTOLIC BLOOD PRESSURE: 119 MMHG | TEMPERATURE: 97.2 F | DIASTOLIC BLOOD PRESSURE: 77 MMHG | HEART RATE: 66 BPM

## 2021-09-08 DIAGNOSIS — D17.30 LIPOMA OF SKIN AND SUBCUTANEOUS TISSUE: ICD-10-CM

## 2021-09-08 PROCEDURE — 99202 OFFICE O/P NEW SF 15 MIN: CPT | Performed by: SURGERY

## 2021-09-08 ASSESSMENT — MIFFLIN-ST. JEOR: SCORE: 1985.88

## 2021-09-08 NOTE — PROGRESS NOTES
30-year-old male complaining of 2 masses on his upper left arm.  Also 1 on his right torso at mid axillary line.  These are small and not interfering with activities of daily living.  If he pushes on them he can cause pain but normally they are otherwise asymptomatic.    Exam: Left upper extremity is 2 small subcentimeter palpable subcutaneous masses.  Nontender.  Right upper abdomen at anterior axillary line is small 1 cm subcutaneous mass.  Also nontender.    Assessment and plan: 30-year-old male with 3 small subcutaneous lipomas.  I recommend not removal as these are not  interfering with activities of daily living.  I explained to the patient that these can always recur or he could get another lipoma in another location and unless there is significantly interfering with activities of daily living I do not recommend removal.  Patient understood and will keep an eye on these.    Brendan Farr MD

## 2021-09-08 NOTE — NURSING NOTE
"Initial /77 (BP Location: Right arm, Patient Position: Sitting, Cuff Size: Adult Large)   Pulse 66   Temp 97.2  F (36.2  C) (Tympanic)   Ht 1.753 m (5' 9\")   Wt 102.6 kg (226 lb 1.3 oz)   BMI 33.39 kg/m   Estimated body mass index is 33.39 kg/m  as calculated from the following:    Height as of this encounter: 1.753 m (5' 9\").    Weight as of this encounter: 102.6 kg (226 lb 1.3 oz). .    Oneida Harvey MA    "

## 2021-09-08 NOTE — LETTER
9/8/2021         RE: Emmett Infante  [address] [facility] [id number]        Dear Colleague,    Thank you for referring your patient, Emmett Infante, to the [facility]. Please see a copy of my visit note below.    30-year-old male complaining of 2 masses on his upper left arm.  Also 1 on his right torso at mid axillary line.  These are small and not interfering with activities of daily living.  If he pushes on them he can cause pain but normally they are otherwise asymptomatic.    Exam: Left upper extremity is 2 small subcentimeter palpable subcutaneous masses.  Nontender.  Right upper abdomen at anterior axillary line is small 1 cm subcutaneous mass.  Also nontender.    Assessment and plan: 30-year-old male with 3 small subcutaneous lipomas.  I recommend not removal as these are not  interfering with activities of daily living.  I explained to the patient that these can always recur or he could get another lipoma in another location and unless there is significantly interfering with activities of daily living I do not recommend removal.  Patient understood and will keep an eye on these.    Brendan Farr MD         Again, thank you for allowing me to participate in the care of your patient.        Sincerely,        Brendan Farr MD

## 2021-10-03 ENCOUNTER — HEALTH MAINTENANCE LETTER (OUTPATIENT)
Age: 31
End: 2021-10-03

## 2021-11-08 ENCOUNTER — MYC MEDICAL ADVICE (OUTPATIENT)
Dept: FAMILY MEDICINE | Facility: CLINIC | Age: 31
End: 2021-11-08
Payer: COMMERCIAL

## 2021-11-15 ENCOUNTER — VIRTUAL VISIT (OUTPATIENT)
Dept: FAMILY MEDICINE | Facility: CLINIC | Age: 31
End: 2021-11-15
Payer: COMMERCIAL

## 2021-11-15 DIAGNOSIS — F41.9 ANXIETY: ICD-10-CM

## 2021-11-15 DIAGNOSIS — F32.5 MAJOR DEPRESSIVE DISORDER WITH SINGLE EPISODE, IN FULL REMISSION (H): ICD-10-CM

## 2021-11-15 DIAGNOSIS — D17.30 LIPOMA OF SKIN AND SUBCUTANEOUS TISSUE: Primary | ICD-10-CM

## 2021-11-15 PROCEDURE — 99213 OFFICE O/P EST LOW 20 MIN: CPT | Mod: 95 | Performed by: NURSE PRACTITIONER

## 2021-11-15 RX ORDER — SERTRALINE HYDROCHLORIDE 100 MG/1
100 TABLET, FILM COATED ORAL DAILY
Qty: 90 TABLET | Refills: 1 | Status: SHIPPED | OUTPATIENT
Start: 2021-11-15 | End: 2022-05-23

## 2021-11-15 ASSESSMENT — ANXIETY QUESTIONNAIRES
GAD7 TOTAL SCORE: 4
1. FEELING NERVOUS, ANXIOUS, OR ON EDGE: MORE THAN HALF THE DAYS
7. FEELING AFRAID AS IF SOMETHING AWFUL MIGHT HAPPEN: NOT AT ALL
3. WORRYING TOO MUCH ABOUT DIFFERENT THINGS: SEVERAL DAYS
IF YOU CHECKED OFF ANY PROBLEMS ON THIS QUESTIONNAIRE, HOW DIFFICULT HAVE THESE PROBLEMS MADE IT FOR YOU TO DO YOUR WORK, TAKE CARE OF THINGS AT HOME, OR GET ALONG WITH OTHER PEOPLE: NOT DIFFICULT AT ALL
6. BECOMING EASILY ANNOYED OR IRRITABLE: NOT AT ALL
2. NOT BEING ABLE TO STOP OR CONTROL WORRYING: SEVERAL DAYS
5. BEING SO RESTLESS THAT IT IS HARD TO SIT STILL: NOT AT ALL

## 2021-11-15 ASSESSMENT — PATIENT HEALTH QUESTIONNAIRE - PHQ9: 5. POOR APPETITE OR OVEREATING: NOT AT ALL

## 2021-11-15 NOTE — PROGRESS NOTES
"Emmett is a 30 year old who is being evaluated via a billable video visit.      How would you like to obtain your AVS? MyChart  If the video visit is dropped, the invitation should be resent by: Send to e-mail at: [email or web address]  Will anyone else be joining your video visit? No      Video Start Time: 4:31 PM    Assessment & Plan     Major depressive disorder with single episode, in full remission (H)  Doing well on sertraline. No medication changes. Continue on medication on current dose. Discussed weaning if this is something he wishes to do in the future.   - sertraline (ZOLOFT) 100 MG tablet; Take 1 tablet (100 mg) by mouth daily    Anxiety  - sertraline (ZOLOFT) 100 MG tablet; Take 1 tablet (100 mg) by mouth daily    Lipoma of skin and subcutaneous tissue  Derm referral placed. He would like excised. He has been seen by general surgery and was not recommended to have these removed. He is wondering about a 2nd opinion.   - Adult Dermatology Referral; Future             BMI:   Estimated body mass index is 33.39 kg/m  as calculated from the following:    Height as of 9/8/21: 1.753 m (5' 9\").    Weight as of 9/8/21: 102.6 kg (226 lb 1.3 oz).           Return in about 6 months (around 5/15/2022) for depression/ annual preventative care .    FIDEL Tsang CNP  [facility]   Emmett is a 30 year old who presents for the following health issues:    History of Present Illness       He eats 2-3 servings of fruits and vegetables daily.He consumes 0 sweetened beverage(s) daily.He exercises with enough effort to increase his heart rate 30 to 60 minutes per day.  He exercises with enough effort to increase his heart rate 4 days per week.   He is taking medications regularly.       Medication Followup of Sertraline    Taking Medication as prescribed: yes    Side Effects:  None     PHQ-9 score:    PHQ 11/15/2021   PHQ-9 Total Score 4   Q9: Thoughts of better off dead/self-harm " past 2 weeks Not at all   F/U: Thoughts of suicide or self-harm -   F/U: Safety concerns -       MARTHA-7 SCORE 2/26/2021 5/3/2021 11/15/2021   Total Score 4 8 4     Depression and Anxiety Follow-Up    How are you doing with your depression since your last visit? Improved sertraline is working well    How are you doing with your anxiety since your last visit?  Improved significantly    Are you having other symptoms that might be associated with depression or anxiety? No    Have you had a significant life event? No     Do you have any concerns with your use of alcohol or other drugs? No    Social History     Tobacco Use     Smoking status: Never Smoker     Smokeless tobacco: Never Used   Substance Use Topics     Alcohol use: Yes     Alcohol/week: 2.0 standard drinks     Drug use: Never     PHQ 4/2/2021 5/3/2021 11/15/2021   PHQ-9 Total Score 8 6 4   Q9: Thoughts of better off dead/self-harm past 2 weeks Several days Not at all Not at all   F/U: Thoughts of suicide or self-harm No - -   F/U: Safety concerns No - -     AMRTHA-7 SCORE 2/26/2021 5/3/2021 11/15/2021   Total Score 4 8 4       Review of Systems   Constitutional, HEENT, cardiovascular, pulmonary, gi and gu systems are negative, except as otherwise noted.      Objective           Vitals:  No vitals were obtained today due to virtual visit.    Physical Exam   GENERAL: Healthy, alert and no distress  EYES: Eyes grossly normal to inspection.  No discharge or erythema, or obvious scleral/conjunctival abnormalities.  RESP: No audible wheeze, cough, or visible cyanosis.  No visible retractions or increased work of breathing.    SKIN: Visible skin clear. No significant rash, abnormal pigmentation or lesions.  NEURO: Cranial nerves grossly intact.  Mentation and speech appropriate for age.  PSYCH: Mentation appears normal, affect normal/bright, judgement and insight intact, normal speech and appearance well-groomed.                Video-Visit Details    Type of service:   Video Visit    Video End Time:4:43 PM    Originating Location (pt. Location): Home    Distant Location (provider location):  [facility]     Platform used for Video Visit: Juanita

## 2021-11-16 ASSESSMENT — PATIENT HEALTH QUESTIONNAIRE - PHQ9: SUM OF ALL RESPONSES TO PHQ QUESTIONS 1-9: 4

## 2021-11-16 ASSESSMENT — ANXIETY QUESTIONNAIRES: GAD7 TOTAL SCORE: 4

## 2021-12-06 ENCOUNTER — MYC MEDICAL ADVICE (OUTPATIENT)
Dept: FAMILY MEDICINE | Facility: CLINIC | Age: 31
End: 2021-12-06
Payer: COMMERCIAL

## 2021-12-06 DIAGNOSIS — F32.5 MAJOR DEPRESSIVE DISORDER WITH SINGLE EPISODE, IN FULL REMISSION (H): Primary | ICD-10-CM

## 2021-12-06 RX ORDER — BUPROPION HYDROCHLORIDE 150 MG/1
150 TABLET ORAL EVERY MORNING
Qty: 60 TABLET | Refills: 0 | Status: SHIPPED | OUTPATIENT
Start: 2021-12-06 | End: 2022-01-17

## 2022-01-03 ENCOUNTER — APPOINTMENT (OUTPATIENT)
Dept: URBAN - METROPOLITAN AREA CLINIC 252 | Age: 32
Setting detail: DERMATOLOGY
End: 2022-01-04

## 2022-01-03 VITALS — HEIGHT: 69 IN | WEIGHT: 250 LBS | RESPIRATION RATE: 16 BRPM

## 2022-01-03 DIAGNOSIS — D17 BENIGN LIPOMATOUS NEOPLASM: ICD-10-CM

## 2022-01-03 PROBLEM — D17.1 BENIGN LIPOMATOUS NEOPLASM OF SKIN AND SUBCUTANEOUS TISSUE OF TRUNK: Status: ACTIVE | Noted: 2022-01-03

## 2022-01-03 PROCEDURE — 99202 OFFICE O/P NEW SF 15 MIN: CPT

## 2022-01-03 PROCEDURE — OTHER MIPS QUALITY: OTHER

## 2022-01-03 PROCEDURE — OTHER COUNSELING: OTHER

## 2022-01-03 ASSESSMENT — LOCATION SIMPLE DESCRIPTION DERM
LOCATION SIMPLE: ABDOMEN
LOCATION SIMPLE: UPPER BACK

## 2022-01-03 ASSESSMENT — LOCATION ZONE DERM: LOCATION ZONE: TRUNK

## 2022-01-03 ASSESSMENT — LOCATION DETAILED DESCRIPTION DERM
LOCATION DETAILED: SUPERIOR THORACIC SPINE
LOCATION DETAILED: RIGHT RIB CAGE

## 2022-01-03 NOTE — PROCEDURE: MIPS QUALITY
Detail Level: Detailed
Quality 226: Preventive Care And Screening: Tobacco Use: Screening And Cessation Intervention: Patient screened for tobacco use and is an ex/non-smoker
Quality 402: Tobacco Use And Help With Quitting Among Adolescents: Patient screened for tobacco and is an ex-smoker
Quality 431: Preventive Care And Screening: Unhealthy Alcohol Use - Screening: Patient not identified as an unhealthy alcohol user when screened for unhealthy alcohol use using a systematic screening method

## 2022-01-03 NOTE — PROCEDURE: COUNSELING
Patient Specific Counseling (Will Not Stick From Patient To Patient): Recommended patient follow-up with a plastic surgeon for treatment of lipoma on the upper back due to the size. Will send referral to Dr. Galvez at [facility].\\nDiscussed can remove lipoma on the right flank in clinic if patient desires. Recommended patient contact insurance company for coverage due to lesions being painful and growing. Patient given CPT codes today.\\nLipoma on upper back- 6 x 5\\nLipoma on right flank- 1.5 x 1\\nPictures taken today. Patient Specific Counseling (Will Not Stick From Patient To Patient): Recommended patient follow-up with a plastic surgeon for treatment of lipoma on the upper back due to the size. Will send referral to Dr. Galvez at [facility].\\nDiscussed can remove lipoma on the right flank in clinic if patient desires. Recommended patient contact insurance company for coverage due to lesions being painful and growing. Patient given CPT codes today.\\nLipoma on upper back- 6 x 5\\nLipoma on right flank- 1.5 x 1\\nPictures taken today.

## 2022-01-17 ENCOUNTER — VIRTUAL VISIT (OUTPATIENT)
Dept: FAMILY MEDICINE | Facility: CLINIC | Age: 32
End: 2022-01-17
Payer: COMMERCIAL

## 2022-01-17 DIAGNOSIS — F32.5 MAJOR DEPRESSIVE DISORDER WITH SINGLE EPISODE, IN FULL REMISSION (H): ICD-10-CM

## 2022-01-17 PROCEDURE — 99213 OFFICE O/P EST LOW 20 MIN: CPT | Mod: 95 | Performed by: NURSE PRACTITIONER

## 2022-01-17 RX ORDER — BUPROPION HYDROCHLORIDE 150 MG/1
150 TABLET ORAL EVERY MORNING
Qty: 90 TABLET | Refills: 0 | Status: SHIPPED | OUTPATIENT
Start: 2022-01-17 | End: 2022-05-04

## 2022-01-17 ASSESSMENT — ANXIETY QUESTIONNAIRES
6. BECOMING EASILY ANNOYED OR IRRITABLE: NOT AT ALL
4. TROUBLE RELAXING: SEVERAL DAYS
2. NOT BEING ABLE TO STOP OR CONTROL WORRYING: NOT AT ALL
3. WORRYING TOO MUCH ABOUT DIFFERENT THINGS: NOT AT ALL
GAD7 TOTAL SCORE: 2
7. FEELING AFRAID AS IF SOMETHING AWFUL MIGHT HAPPEN: NOT AT ALL
7. FEELING AFRAID AS IF SOMETHING AWFUL MIGHT HAPPEN: NOT AT ALL
GAD7 TOTAL SCORE: 2
5. BEING SO RESTLESS THAT IT IS HARD TO SIT STILL: NOT AT ALL
1. FEELING NERVOUS, ANXIOUS, OR ON EDGE: SEVERAL DAYS
GAD7 TOTAL SCORE: 2

## 2022-01-17 ASSESSMENT — PATIENT HEALTH QUESTIONNAIRE - PHQ9
SUM OF ALL RESPONSES TO PHQ QUESTIONS 1-9: 2
SUM OF ALL RESPONSES TO PHQ QUESTIONS 1-9: 2
10. IF YOU CHECKED OFF ANY PROBLEMS, HOW DIFFICULT HAVE THESE PROBLEMS MADE IT FOR YOU TO DO YOUR WORK, TAKE CARE OF THINGS AT HOME, OR GET ALONG WITH OTHER PEOPLE: NOT DIFFICULT AT ALL

## 2022-01-17 NOTE — PROGRESS NOTES
"Emmett is a 31 year old who is being evaluated via a billable video visit.      How would you like to obtain your AVS? MyChart  If the video visit is dropped, the invitation should be resent by: Text to cell phone: [phone]  Will anyone else be joining your video visit? No      Video Start Time: 5:21 PM    Assessment & Plan     Major depressive disorder with single episode, in full remission (H)  Refilled wellbutrin for patient. Doing well on the medication and has helped with the side effects related to the sertraline. Continue on current medication as well as sertraline without changes.   - buPROPion (WELLBUTRIN XL) 150 MG 24 hr tablet; Take 1 tablet (150 mg) by mouth every morning             BMI:   Estimated body mass index is 33.39 kg/m  as calculated from the following:    Height as of 9/8/21: 1.753 m (5' 9\").    Weight as of 9/8/21: 102.6 kg (226 lb 1.3 oz).           Return in about 6 months (around 7/17/2022) for Routine preventive, or sooner if symptoms of concern are present..    Monika Strong, FIDEL CNP  [facility]    Subjective   Emmett is a 31 year old who presents for the following health issues:    HPI     Depression and Anxiety Follow-Up    How are you doing with your depression since your last visit? Improved     How are you doing with your anxiety since your last visit?  Improved     Are you having other symptoms that might be associated with depression or anxiety? No    Have you had a significant life event? No     Do you have any concerns with your use of alcohol or other drugs? No     Has improved overall all symptoms of depression as well as side effects related to the sertraline.     Social History     Tobacco Use     Smoking status: Never Smoker     Smokeless tobacco: Never Used   Vaping Use     Vaping Use: Never used   Substance Use Topics     Alcohol use: Yes     Alcohol/week: 2.0 standard drinks     Drug use: Never     PHQ 5/3/2021 11/15/2021 1/17/2022   PHQ-9 " Total Score 6 4 2   Q9: Thoughts of better off dead/self-harm past 2 weeks Not at all Not at all Not at all   F/U: Thoughts of suicide or self-harm - - -   F/U: Safety concerns - - -     MARTHA-7 SCORE 5/3/2021 11/15/2021 1/17/2022   Total Score - - 2 (minimal anxiety)   Total Score 8 4 2         Suicide Assessment Five-step Evaluation and Treatment (SAFE-T)            Review of Systems   Constitutional, HEENT, cardiovascular, pulmonary, gi and gu systems are negative, except as otherwise noted.      Objective           Vitals:  No vitals were obtained today due to virtual visit.    Physical Exam   GENERAL: Healthy, alert and no distress  EYES: Eyes grossly normal to inspection.  No discharge or erythema, or obvious scleral/conjunctival abnormalities.  RESP: No audible wheeze, cough, or visible cyanosis.  No visible retractions or increased work of breathing.    SKIN: Visible skin clear. No significant rash, abnormal pigmentation or lesions.  NEURO: Cranial nerves grossly intact.  Mentation and speech appropriate for age.  PSYCH: Mentation appears normal, affect normal/bright, judgement and insight intact, normal speech and appearance well-groomed.                Video-Visit Details    Type of service:  Video Visit    Video End Time:5:25 PM    Originating Location (pt. Location): Home    Distant Location (provider location):  [facility]     Platform used for Video Visit: [employer]  Answers for HPI/ROS submitted by the patient on 1/17/2022  If you checked off any problems, how difficult have these problems made it for you to do your work, take care of things at home, or get along with other people?: Not difficult at all  PHQ9 TOTAL SCORE: 2  MARTHA 7 TOTAL SCORE: 2

## 2022-01-18 ASSESSMENT — ANXIETY QUESTIONNAIRES: GAD7 TOTAL SCORE: 2

## 2022-01-23 ENCOUNTER — HEALTH MAINTENANCE LETTER (OUTPATIENT)
Age: 32
End: 2022-01-23

## 2022-05-02 ENCOUNTER — OFFICE VISIT (OUTPATIENT)
Dept: FAMILY MEDICINE | Facility: CLINIC | Age: 32
End: 2022-05-02
Payer: COMMERCIAL

## 2022-05-02 VITALS
RESPIRATION RATE: 12 BRPM | OXYGEN SATURATION: 98 % | DIASTOLIC BLOOD PRESSURE: 80 MMHG | HEIGHT: 70 IN | WEIGHT: 248 LBS | HEART RATE: 66 BPM | BODY MASS INDEX: 35.5 KG/M2 | TEMPERATURE: 97.8 F | SYSTOLIC BLOOD PRESSURE: 118 MMHG

## 2022-05-02 DIAGNOSIS — Z01.818 PREOP GENERAL PHYSICAL EXAM: Primary | ICD-10-CM

## 2022-05-02 DIAGNOSIS — D17.30 LIPOMA OF SKIN AND SUBCUTANEOUS TISSUE: ICD-10-CM

## 2022-05-02 PROCEDURE — U0005 INFEC AGEN DETEC AMPLI PROBE: HCPCS | Performed by: NURSE PRACTITIONER

## 2022-05-02 PROCEDURE — U0003 INFECTIOUS AGENT DETECTION BY NUCLEIC ACID (DNA OR RNA); SEVERE ACUTE RESPIRATORY SYNDROME CORONAVIRUS 2 (SARS-COV-2) (CORONAVIRUS DISEASE [COVID-19]), AMPLIFIED PROBE TECHNIQUE, MAKING USE OF HIGH THROUGHPUT TECHNOLOGIES AS DESCRIBED BY CMS-2020-01-R: HCPCS | Performed by: NURSE PRACTITIONER

## 2022-05-02 PROCEDURE — 99214 OFFICE O/P EST MOD 30 MIN: CPT | Performed by: NURSE PRACTITIONER

## 2022-05-02 ASSESSMENT — PAIN SCALES - GENERAL: PAINLEVEL: NO PAIN (0)

## 2022-05-02 NOTE — PATIENT INSTRUCTIONS
Preparing for Your Surgery  Getting started  A nurse will call you to review your health history and instructions. They will give you an arrival time based on your scheduled surgery time. Please be ready to share:  Your doctor's clinic name and phone number  Your medical, surgical and anesthesia history  A list of allergies and sensitivities  A list of medicines, including herbal treatments and over-the-counter drugs  Whether the patient has a legal guardian (ask how to send us the papers in advance)  Please tell us if you're pregnant--or if there's any chance you might be pregnant. Some surgeries may injure a fetus (unborn baby), so they require a pregnancy test. Surgeries that are safe for a fetus don't always need a test, and you can choose whether to have one.   If you have a child who's having surgery, please ask for a copy of Preparing for Your Child's Surgery.    Preparing for surgery  Within 30 days of surgery: Have a pre-op exam (sometimes called an H&P, or History and Physical). This can be done at a clinic or pre-operative center.  If you're having a , you may not need this exam. Talk to your care team.  At your pre-op exam, talk to your care team about all medicines you take. If you need to stop any medicines before surgery, ask when to start taking them again.  We do this for your safety. Many medicines can make you bleed too much during surgery. Some change how well surgery (anesthesia) drugs work.  Call your insurance company to let them know you're having surgery. (If you don't have insurance, call [phone].)  Call your clinic if there's any change in your health. This includes signs of a cold or flu (sore throat, runny nose, cough, rash, fever). It also includes a scrape or scratch near the surgery site.  If you have questions on the day of surgery, call your hospital or surgery center.  COVID testing  You may need to be tested for COVID-19 before having surgery. If so, we will give  you instructions.  Eating and drinking guidelines  For your safety: Unless your surgeon tells you otherwise, follow the guidelines below.  Eat and drink as usual until 8 hours before surgery. After that, no food or milk.  Drink clear liquids until 2 hours before surgery. These are liquids you can see through, like water, Gatorade and Propel Water. You may also have black coffee and tea (no cream or milk).  Nothing by mouth within 2 hours of surgery. This includes gum, candy and breath mints.  If you drink alcohol: Stop drinking it the night before surgery.  If your care team tells you to take medicine on the morning of surgery, it's okay to take it with a sip of water.  Preventing infection  Shower or bathe the night before and morning of your surgery. Follow the instructions your clinic gave you. (If no instructions, use regular soap.)  Don't shave or clip hair near your surgery site. We'll remove the hair if needed.  Don't smoke or vape the morning of surgery. You may chew nicotine gum up to 2 hours before surgery. A nicotine patch is okay.  Note: Some surgeries require you to completely quit smoking and nicotine. Check with your surgeon.  Your care team will make every effort to keep you safe from infection. We will:  Clean our hands often with soap and water (or an alcohol-based hand rub).  Clean the skin at your surgery site with a special soap that kills germs.  Give you a special gown to keep you warm. (Cold raises the risk of infection.)  Wear special hair covers, masks, gowns and gloves during surgery.  Give antibiotic medicine, if prescribed. Not all surgeries need antibiotics.  What to bring on the day of surgery  Photo ID and insurance card  Copy of your health care directive, if you have one  Glasses and hearing aides (bring cases)  You can't wear contacts during surgery  Inhaler and eye drops, if you use them (tell us about these when you arrive)  CPAP machine or breathing device, if you use them  A  few personal items, if spending the night  If you have . . .  A pacemaker, ICD (cardiac defibrillator) or other implant: Bring the ID card.  An implanted stimulator: Bring the remote control.  A legal guardian: Bring a copy of the certified (court-stamped) guardianship papers.  Please remove any jewelry, including body piercings. Leave jewelry and other valuables at home.  If you're going home the day of surgery  You must have a responsible adult drive you home. They should stay with you overnight as well.  If you don't have someone to stay with you, and you aren't safe to go home alone, we may keep you overnight. Insurance often won't pay for this.  After surgery  If it's hard to control your pain or you need more pain medicine, please call your surgeon's office.  Questions?   If you have any questions for your care team, list them here: _________________________________________________________________________________________________________________________________________________________________________ ____________________________________ ____________________________________ ____________________________________  For informational purposes only. Not to replace the advice of your health care provider. Copyright   2003, 2019 [employer]. All rights reserved. Clinically reviewed by Linda Ellison MD. [employer] [id number] - REV 07/21.    Before Your Procedure or Hospital Admission  Testing for COVID-19 (Coronavirus)  Thank you for choosing [facility] for your health care needs. The COVID-19 pandemic is a very challenging time for everyone.   Our goal is to keep you and our team here at [facility] safe and healthy. We've taken many steps to make this happen. For example:  We test and screen our staff, care teams and patients for COVID-19.  Everyone at [facility] must wear a mask and stay 6 feet apart.  We are limiting hospital and clinic visitors.  Before you come in  If you test COVID-19  "positive with any kind of test before your surgery date, call your surgeon's office. We need to know the date of your positive test. We may need to re-schedule your surgery.  Unless you've had a positive COVID-19 test within the past 90 days, you must get tested for COVID-19 even if you've been vaccinated. Your test needs to happen 2 to 4 days before you check in to the hospital or surgery site.  A clinic scheduler will call you about a week in advance to set up a testing time at one of our labs.  Note: If you have a test anywhere but [facility], be sure you get an RT-PCR or an NAAT (Nucleic Acid Amplification Test) test.  Do NOT get a \"rapid antigen\" test. Negative results from \"rapid antigen\" tests are NOT accepted before your surgery.  After the test, please stay at home and out of contact with other people. This will help prevent possible COVID-19 exposure before your treatment. Please follow all current safety guidelines, including:  Limit trips outside your home.  Limit the number of people you see.  Always wear a mask outside your home.  Use social distancing. Stay 6 feet away from others whenever you can.  Wash your hands often.  If your test shows you have COVID-19  If your test is positive, we'll let you know. A positive test means that you have the virus.   We'll probably have to postpone your admission, surgery or procedure. Your doctor will discuss this with you. After that, we'll let you know what to do and when you can re-schedule.   We may need to cancel your treatment on short notice for other reasons, too.  If your test shows you DON'T have COVID-19  Even if your test is negative, you can still get COVID-19. It's rare but, sometimes, the test result is wrong. You could also catch the virus after taking the test.   There's a very small chance that you could catch COVID-19 in the hospital or surgery center. [facility] has taken many steps to prevent this from happening.   Day of your " surgery or procedure  Please come wearing a face covering that covers both your nose and mouth.  When you arrive, we'll ask you some questions to find out if you've had any exposures to COVID-19, or have any signs of COVID-19.  Ask your care team if you can have visitors. All visitors must wear face coverings and will be screened for exposure to, or signs of, COVID-19.  Even if no visitors are allowed, you can still have with you:  Your legal guardian or legal decision maker  Someone to help you, if you are disabled  A parent and one other visitor, if you are younger than 18 years old  A partner and a , if you are in labor  We might need to teach you about taking care of yourself after surgery. If so, a visitor can come into the hospital to learn about it, too.  The rules for visitors change often, depending on how much the virus is spreading. To learn more, see Visiting a Loved One in the Hospital during the COVID-19 Outbreak.  Please call your care team, hospital or surgery center if you have any questions. We thank you for your understanding and for choosing [facility] for your care.   Possible surgery delay  Like you, we want your surgery to happen when it's scheduled. But sometimes the hospital is so full that it's not safe for you to have your surgery. This is especially true during the pandemic. Your surgery may need to be re-scheduled at a later date. If this happens, we will call and tell you.  Questions and answers  Does it matter where I get tested for COVID-19?  Yes. We urge you to get tested at one of our [facility] COVID-19 testing sites. These tests will be either RT-PCR or NAAT, and are accepted. We process these tests in our lab and can get the results quickly. Your [facility] care team needs to get your results before you check in.  What should I do if I can't get tested at [facility]?  You can get tested somewhere else, but you'll need to take these extra steps:  "  Contact your family doctor or clinic to arrange your test.  Take the test within 4 days of your surgery or procedure. We can't accept tests older than 4 days.  Make sure you're getting an RT-PCR test, or a NAAT. Some places use \"rapid antigen\" tests, but we do NOT accept negative results from those tests before your surgery.  Make sure your doctor or clinic faxes your results to [facility] at [phone]. Or take a photo of the results and upload it into [employer].  If we don't get your results in time, we may have to delay or cancel your treatment.  For informational purposes only. Not to replace the advice of your health care provider. Copyright   2020 [facility]. All rights reserved. Clinically reviewed by [facility] and the [facility] COVID-19 Clinical Team. [employer] [id number] - Rev 02/01/22.    How to Take Your Medication Before Surgery  - Take all of your medications before surgery as usual    "

## 2022-05-02 NOTE — PROGRESS NOTES
[facility]  [address] [address]  Phone: [phone]  Primary Provider: Monika Strong  Pre-op Performing Provider: MONIKA STRONG      PREOPERATIVE EVALUATION:  Today's date: 5/2/2022    Emmett Infante is a 31 year old male who presents for a preoperative evaluation.    Surgical Information:  Surgery/Procedure: Excision Lipoma/Mass, Back  Surgery Location: [facility]   Surgeon:    Surgery Date: 5/5/22  Time of Surgery: 10am   Where patient plans to recover: At home with family   Fax number for surgical facility: [phone]        Type of Anesthesia Anticipated: General    Assessment & Plan     The proposed surgical procedure is considered LOW risk.      ICD-10-CM    1. Preop general physical exam  Z01.818 Asymptomatic COVID-19 Virus (Coronavirus) by PCR Nose     Asymptomatic COVID-19 Virus (Coronavirus) by PCR Nose   2. Lipoma of skin and subcutaneous tissue  D17.30      Low risk for planned procedure recommend proceeding without further clinical clarification.    Possible Sleep Apnea: no sleep apnea.    STOP-Bang Total Score 5/2/2022   Total Score 1   Risk Stratification 0 - 2: Low Risk for CRISTHIAN       Risks and Recommendations:  The patient has the following additional risks and recommendations for perioperative complications:   - No identified additional risk factors other than previously addressed    Medication Instructions:  Patient is to take all scheduled medications on the day of surgery    RECOMMENDATION:  APPROVAL GIVEN to proceed with proposed procedure, without further diagnostic evaluation.            Subjective     HPI related to upcoming procedure: history of numerous lipomas. Two specific lipomas are causing symptoms and pain with daily activities. One of the left lateral arm and right upper back.     Preop Questions 4/25/2022   1. Have you ever had a heart attack or stroke? No   2. Have you ever had surgery on your heart or  blood vessels, such as a stent placement, a coronary artery bypass, or surgery on an artery in your head, neck, heart, or legs? No   3. Do you have chest pain with activity? No   4. Do you have a history of  heart failure? No   5. Do you currently have a cold, bronchitis or symptoms of other infection? No   6. Do you have a cough, shortness of breath, or wheezing? No   7. Do you or anyone in your family have previous history of blood clots? No   8. Do you or does anyone in your family have a serious bleeding problem such as prolonged bleeding following surgeries or cuts? No   9. Have you ever had problems with anemia or been told to take iron pills? No   10. Have you had any abnormal blood loss such as black, tarry or bloody stools? No   11. Have you ever had a blood transfusion? No   12. Are you willing to have a blood transfusion if it is medically needed before, during, or after your surgery? Yes   13. Have you or any of your relatives ever had problems with anesthesia? No   14. Do you have sleep apnea, excessive snoring or daytime drowsiness? No   15. Do you have any artifical heart valves or other implanted medical devices like a pacemaker, defibrillator, or continuous glucose monitor? No   16. Do you have artificial joints? No   17. Are you allergic to latex? No       Health Care Directive:  Patient does not have a Health Care Directive or Living Will: Discussed advance care planning with patient; information given to patient to review.    Preoperative Review of :   reviewed - no record of controlled substances prescribed.  PDMP Review       Value Time User    State PDMP site checked  Yes 5/2/2022  4:08 PM Monika Strong APRN CNP            Status of Chronic Conditions:  See problem list for active medical problems.  Problems all longstanding and stable, except as noted/documented.  See ROS for pertinent symptoms related to these conditions.      Review of Systems  CONSTITUTIONAL: NEGATIVE for fever,  chills, change in weight  INTEGUMENTARY/SKIN: NEGATIVE for worrisome rashes, moles or lesions  EYES: NEGATIVE for vision changes or irritation  ENT/MOUTH: NEGATIVE for ear, mouth and throat problems  RESP: NEGATIVE for significant cough or SOB  CV: NEGATIVE for chest pain, palpitations or peripheral edema  GI: NEGATIVE for nausea, abdominal pain, heartburn, or change in bowel habits  : NEGATIVE for frequency, dysuria, or hematuria  MUSCULOSKELETAL: NEGATIVE for significant arthralgias or myalgia  NEURO: NEGATIVE for weakness, dizziness or paresthesias  ENDOCRINE: NEGATIVE for temperature intolerance, skin/hair changes  HEME: NEGATIVE for bleeding problems  PSYCHIATRIC: NEGATIVE for changes in mood or affect    Patient Active Problem List    Diagnosis Date Noted     Major depressive disorder with single episode, in partial remission (H) 02/26/2021     Priority: Medium     Anxiety 02/26/2021     Priority: Medium     Acne 12/28/2020     Priority: Medium     Created by Conversion       Nail fungus 04/05/2018     Priority: Medium     Raynaud's phenomenon without gangrene 04/05/2018     Priority: Medium      Past Medical History:   Diagnosis Date     Major depressive disorder with single episode, in partial remission (H)      Past Surgical History:   Procedure Laterality Date     ENT SURGERY       ORTHOPEDIC SURGERY      Maniscus Repair (Right Knee)     Current Outpatient Medications   Medication Sig Dispense Refill     buPROPion (WELLBUTRIN XL) 150 MG 24 hr tablet Take 1 tablet (150 mg) by mouth every morning 90 tablet 0     fish oil-omega-3 fatty acids 1000 MG capsule Take 2 g by mouth       ONE DAILY MULTIPLE VITAMIN OR        sertraline (ZOLOFT) 100 MG tablet Take 1 tablet (100 mg) by mouth daily 90 tablet 1       Allergies   Allergen Reactions     Sulfa Drugs Other (See Comments) and Rash     Allergy testing done          Social History     Tobacco Use     Smoking status: Never Smoker     Smokeless tobacco: Never  "Used   Substance Use Topics     Alcohol use: Yes     Family History   Problem Relation Age of Onset     No Known Problems Mother      No Known Problems Father      Other Cancer Maternal Grandmother         Ovarian Cancer     Heart Disease Maternal Grandfather      Coronary Artery Disease Maternal Grandfather      Heart Disease Paternal Grandfather      Coronary Artery Disease Paternal Grandfather      History   Drug Use Unknown         Objective     /80   Pulse 66   Temp 97.8  F (36.6  C) (Tympanic)   Resp 12   Ht 1.765 m (5' 9.5\")   Wt 112.5 kg (248 lb)   SpO2 98%   BMI 36.10 kg/m      Physical Exam    GENERAL APPEARANCE: healthy, alert and no distress     EYES: EOMI,  PERRL     HENT: ear canals and TM's normal and nose and mouth without ulcers or lesions     NECK: no adenopathy, no asymmetry, masses, or scars and thyroid normal to palpation     RESP: lungs clear to auscultation - no rales, rhonchi or wheezes     CV: regular rates and rhythm, normal S1 S2, no S3 or S4 and no murmur, click or rub     ABDOMEN:  soft, nontender, no HSM or masses and bowel sounds normal     MS: extremities normal- no gross deformities noted, no evidence of inflammation in joints, FROM in all extremities.     SKIN: no suspicious lesions or rashes     NEURO: Normal strength and tone, sensory exam grossly normal, mentation intact and speech normal     PSYCH: mentation appears normal. and affect normal/bright     LYMPHATICS: No cervical adenopathy    Recent Labs   Lab Test 10/23/20  1043   HGB 16.4         POTASSIUM 4.7   CR 1.19        Diagnostics:  No labs were ordered during this visit.   No EKG required for low risk surgery (cataract, skin procedure, breast biopsy, etc).    Revised Cardiac Risk Index (RCRI):  The patient has the following serious cardiovascular risks for perioperative complications:   - No serious cardiac risks = 0 points     RCRI Interpretation: 0 points: Class I (very low risk - 0.4% " complication rate)           Signed Electronically by: FIDEL Tsang CNP  Copy of this evaluation report is provided to requesting physician.

## 2022-05-03 LAB — SARS-COV-2 RNA RESP QL NAA+PROBE: NEGATIVE

## 2022-05-04 DIAGNOSIS — F32.5 MAJOR DEPRESSIVE DISORDER WITH SINGLE EPISODE, IN FULL REMISSION (H): ICD-10-CM

## 2022-05-04 RX ORDER — BUPROPION HYDROCHLORIDE 150 MG/1
150 TABLET ORAL EVERY MORNING
Qty: 90 TABLET | Refills: 0 | Status: SHIPPED | OUTPATIENT
Start: 2022-05-04 | End: 2022-08-09

## 2022-08-09 DIAGNOSIS — F32.5 MAJOR DEPRESSIVE DISORDER WITH SINGLE EPISODE, IN FULL REMISSION (H): ICD-10-CM

## 2022-08-09 RX ORDER — BUPROPION HYDROCHLORIDE 150 MG/1
150 TABLET ORAL EVERY MORNING
Qty: 90 TABLET | Refills: 0 | Status: SHIPPED | OUTPATIENT
Start: 2022-08-09 | End: 2022-11-08

## 2022-08-09 NOTE — TELEPHONE ENCOUNTER
"Requested Prescriptions   Pending Prescriptions Disp Refills     buPROPion (WELLBUTRIN XL) 150 MG 24 hr tablet 90 tablet 0     Sig: Take 1 tablet (150 mg) by mouth every morning       SSRIs Protocol Failed - 8/9/2022  9:12 AM        Failed - PHQ-9 score less than 5 in past 6 months     Please review last PHQ-9 score.           Passed - Medication is Bupropion     If the medication is Bupropion (Wellbutrin), and the patient is taking for smoking cessation; OK to refill.          Passed - Medication is active on med list        Passed - Patient is age 18 or older        Passed - Recent (6 mo) or future (30 days) visit within the authorizing provider's specialty     Patient had office visit in the last 6 months or has a visit in the next 30 days with authorizing provider or within the authorizing provider's specialty.  See \"Patient Info\" tab in inbasket, or \"Choose Columns\" in Meds & Orders section of the refill encounter.                 "

## 2022-09-10 ENCOUNTER — HEALTH MAINTENANCE LETTER (OUTPATIENT)
Age: 32
End: 2022-09-10

## 2022-11-30 DIAGNOSIS — F41.9 ANXIETY: ICD-10-CM

## 2022-11-30 DIAGNOSIS — F32.5 MAJOR DEPRESSIVE DISORDER WITH SINGLE EPISODE, IN FULL REMISSION (H): ICD-10-CM

## 2022-11-30 RX ORDER — SERTRALINE HYDROCHLORIDE 100 MG/1
100 TABLET, FILM COATED ORAL DAILY
Qty: 90 TABLET | Refills: 1 | Status: SHIPPED | OUTPATIENT
Start: 2022-11-30 | End: 2023-03-23

## 2022-11-30 NOTE — TELEPHONE ENCOUNTER
"Requested Prescriptions   Pending Prescriptions Disp Refills    sertraline (ZOLOFT) 100 MG tablet 90 tablet 1     Sig: Take 1 tablet (100 mg) by mouth daily       SSRIs Protocol Failed - 11/30/2022  8:20 AM        Failed - PHQ-9 score less than 5 in past 6 months     Please review last PHQ-9 score.           Failed - Recent (6 mo) or future (30 days) visit within the authorizing provider's specialty     Patient had office visit in the last 6 months or has a visit in the next 30 days with authorizing provider or within the authorizing provider's specialty.  See \"Patient Info\" tab in inbasket, or \"Choose Columns\" in Meds & Orders section of the refill encounter.            Passed - Medication is active on med list        Passed - Patient is age 18 or older             "

## 2022-11-30 NOTE — TELEPHONE ENCOUNTER
Pt is almost out of Sertraline Rx and needs refill ASAP/TODAY, if possible.  No need to call patient back, unless there are questions or problems.

## 2023-02-25 ENCOUNTER — E-VISIT (OUTPATIENT)
Dept: FAMILY MEDICINE | Facility: CLINIC | Age: 33
End: 2023-02-25
Payer: COMMERCIAL

## 2023-02-25 DIAGNOSIS — K21.9 GASTRO-ESOPHAGEAL REFLUX DISEASE WITHOUT ESOPHAGITIS: Primary | ICD-10-CM

## 2023-02-25 PROCEDURE — 99421 OL DIG E/M SVC 5-10 MIN: CPT | Performed by: NURSE PRACTITIONER

## 2023-02-27 RX ORDER — FAMOTIDINE 40 MG/1
40 TABLET, FILM COATED ORAL
Qty: 30 TABLET | Refills: 2 | Status: SHIPPED | OUTPATIENT
Start: 2023-02-27 | End: 2023-04-20

## 2023-02-27 NOTE — PATIENT INSTRUCTIONS
Thank you for choosing us for your care. I have placed an order for a prescription so that you can start treatment. View your full visit summary for details by clicking on the link below. Your pharmacist will able to address any questions you may have about the medication.      If you're not feeling better within 2 weeks please schedule an appointment.  You can schedule an appointment right here in [facility], or call [phone]  If the visit is for the same symptoms as your eVisit, we'll refund the cost of your eVisit if seen within seven days.      Lifestyle Changes for Controlling GERD  When you have GERD, stomach acid feels as if it s backing up toward your mouth. Making lifestyle changes can often improve your symptoms. This is true if you take medicine to control your GERD or not. Talk with your healthcare provider about the following suggestions. They may help you get relief from your symptoms.  Raise your head    Reflux is more likely to happen when you re lying down flat. That's because stomach fluid can flow backward more easily. Raising the head of your bed 4 to 6 inches can help. To do this:    Slide blocks or books under the legs at the head of your bed. Or put a wedge under the mattress. Many [employer] can make a wedge for you. The wedge should go from your waist to the top of your head.    Don t just prop your head up on a few pillows. This increases pressure on your stomach. It can make GERD worse.  Watch your eating habits  Certain foods may increase the acid in your stomach. Or they may relax the lower esophageal sphincter. This makes GERD more likely. It s best to avoid the following if they cause you symptoms:    Coffee, tea, and carbonated drinks (with and without caffeine)    Fatty, fried, or spicy food    Mint, chocolate, onions, tomatoes, and citrus    Peppermint    Any other foods that seem to irritate your stomach or cause you pain  Relieve the pressure  Tips include the  following:    Eat smaller meals, even if you have to eat more often.    Don t lie down right after you eat. Wait a few hours for your stomach to empty.    Don't wear tight belts or tight-fitting clothes.    Lose any extra weight.  Tobacco and alcohol  Don't smoke tobacco or drink alcohol. They can make GERD symptoms worse.  [employer] last reviewed this educational content on 6/1/2019 2000-2021 [employer]. All rights reserved. This information is not intended as a substitute for professional medical care. Always follow your healthcare professional's instructions.

## 2023-03-02 ASSESSMENT — ANXIETY QUESTIONNAIRES
4. TROUBLE RELAXING: NOT AT ALL
2. NOT BEING ABLE TO STOP OR CONTROL WORRYING: NOT AT ALL
1. FEELING NERVOUS, ANXIOUS, OR ON EDGE: SEVERAL DAYS
IF YOU CHECKED OFF ANY PROBLEMS ON THIS QUESTIONNAIRE, HOW DIFFICULT HAVE THESE PROBLEMS MADE IT FOR YOU TO DO YOUR WORK, TAKE CARE OF THINGS AT HOME, OR GET ALONG WITH OTHER PEOPLE: SOMEWHAT DIFFICULT
7. FEELING AFRAID AS IF SOMETHING AWFUL MIGHT HAPPEN: NOT AT ALL
GAD7 TOTAL SCORE: 2
6. BECOMING EASILY ANNOYED OR IRRITABLE: NOT AT ALL
5. BEING SO RESTLESS THAT IT IS HARD TO SIT STILL: NOT AT ALL
GAD7 TOTAL SCORE: 2
3. WORRYING TOO MUCH ABOUT DIFFERENT THINGS: SEVERAL DAYS
GAD7 TOTAL SCORE: 2
8. IF YOU CHECKED OFF ANY PROBLEMS, HOW DIFFICULT HAVE THESE MADE IT FOR YOU TO DO YOUR WORK, TAKE CARE OF THINGS AT HOME, OR GET ALONG WITH OTHER PEOPLE?: SOMEWHAT DIFFICULT
7. FEELING AFRAID AS IF SOMETHING AWFUL MIGHT HAPPEN: NOT AT ALL

## 2023-03-02 ASSESSMENT — PATIENT HEALTH QUESTIONNAIRE - PHQ9
SUM OF ALL RESPONSES TO PHQ QUESTIONS 1-9: 4
SUM OF ALL RESPONSES TO PHQ QUESTIONS 1-9: 4
10. IF YOU CHECKED OFF ANY PROBLEMS, HOW DIFFICULT HAVE THESE PROBLEMS MADE IT FOR YOU TO DO YOUR WORK, TAKE CARE OF THINGS AT HOME, OR GET ALONG WITH OTHER PEOPLE: SOMEWHAT DIFFICULT

## 2023-03-03 ENCOUNTER — VIRTUAL VISIT (OUTPATIENT)
Dept: FAMILY MEDICINE | Facility: CLINIC | Age: 33
End: 2023-03-03
Payer: COMMERCIAL

## 2023-03-03 DIAGNOSIS — F41.9 ANXIETY: ICD-10-CM

## 2023-03-03 DIAGNOSIS — F32.5 MAJOR DEPRESSIVE DISORDER WITH SINGLE EPISODE, IN FULL REMISSION (H): Primary | ICD-10-CM

## 2023-03-03 DIAGNOSIS — K21.9 GASTRO-ESOPHAGEAL REFLUX DISEASE WITHOUT ESOPHAGITIS: ICD-10-CM

## 2023-03-03 PROCEDURE — 99213 OFFICE O/P EST LOW 20 MIN: CPT | Mod: VID | Performed by: NURSE PRACTITIONER

## 2023-03-03 RX ORDER — BUPROPION HYDROCHLORIDE 150 MG/1
150 TABLET ORAL EVERY MORNING
Qty: 90 TABLET | Refills: 1 | Status: SHIPPED | OUTPATIENT
Start: 2023-03-03 | End: 2023-11-10

## 2023-03-03 ASSESSMENT — PATIENT HEALTH QUESTIONNAIRE - PHQ9
10. IF YOU CHECKED OFF ANY PROBLEMS, HOW DIFFICULT HAVE THESE PROBLEMS MADE IT FOR YOU TO DO YOUR WORK, TAKE CARE OF THINGS AT HOME, OR GET ALONG WITH OTHER PEOPLE: SOMEWHAT DIFFICULT
SUM OF ALL RESPONSES TO PHQ QUESTIONS 1-9: 4

## 2023-03-03 ASSESSMENT — ENCOUNTER SYMPTOMS: NERVOUS/ANXIOUS: 1

## 2023-03-03 ASSESSMENT — ANXIETY QUESTIONNAIRES: GAD7 TOTAL SCORE: 2

## 2023-03-03 NOTE — PROGRESS NOTES
" Emmett is a 32 year old who is being evaluated via a billable video visit.      How would you like to obtain your AVS? MyChart  If the video visit is dropped, the invitation should be resent by: Send to e-mail at: [email or web address]  Will anyone else be joining your video visit? No          Assessment & Plan     Major depressive disorder with single episode, in full remission (H)  Stable doing well.   - buPROPion (WELLBUTRIN XL) 150 MG 24 hr tablet; Take 1 tablet (150 mg) by mouth every morning  - FLUoxetine (PROZAC) 20 MG capsule; Take 1 capsule (20 mg) by mouth daily    Anxiety  Stable doing well  - FLUoxetine (PROZAC) 20 MG capsule; Take 1 capsule (20 mg) by mouth daily    Gastro-esophageal reflux disease without esophagitis  Start the famotidine daily. Discussed OTC treatments.        BMI:   Estimated body mass index is 36.1 kg/m  as calculated from the following:    Height as of 5/2/22: 1.765 m (5' 9.5\").    Weight as of 5/2/22: 112.5 kg (248 lb).           Return if symptoms worsen or fail to improve.    FIDEL Tsang CNP  [facility]   Emmett is a 32 year old accompanied by his self, presenting for the following health issues:  Abdominal Pain, Anxiety, and Health Maintenance (Advised patient of .)      Anxiety    History of Present Illness       Mental Health Follow-up:  Patient presents to follow-up on Depression & Anxiety.Patient's depression since last visit has been:  Good  The patient is not having other symptoms associated with depression.  Patient's anxiety since last visit has been:  Medium  The patient is not having other symptoms associated with anxiety.  Any significant life events: No  Patient is not feeling anxious or having panic attacks.  Patient has no concerns about alcohol or drug use.    He eats 2-3 servings of fruits and vegetables daily.He consumes 0 sweetened beverage(s) daily.He exercises with enough effort to increase his heart rate " 30 to 60 minutes per day.  He exercises with enough effort to increase his heart rate 5 days per week.   He is taking medications regularly.    Today's PHQ-9         PHQ-9 Total Score: 4    PHQ-9 Q9 Thoughts of better off dead/self-harm past 2 weeks :   Not at all    How difficult have these problems made it for you to do your work, take care of things at home, or get along with other people: Somewhat difficult  Today's MARTHA-7 Score: 2       GERD/Heartburn  Onset/Duration: x1 year  Description: varies how often each week 1-6 days and multiple times a day. Happens randomly, sometimes it's with certain food, and the foods change as well  Intensity: moderate  Progression of Symptoms: same  Accompanying Signs & Symptoms:  Does it feel like food gets stuck or trouble swallowing: No  Nausea: No  Vomiting (bloody?): No  Abdominal Pain: No  Black-Tarry stools: No  Bloody stools: No  History:  Previous similar episodes: No  Previous ulcers: No  Precipitating factors:   Caffeine use: YES- 2 cups of coffee a day  Alcohol use: No  NSAID/Aspirin use: No  Tobacco use: No  Worse with no particular food or drink.  Alleviating factors: Prilosec, Tums sometimes help. Milk always helps  Therapies tried and outcome:             Lifestyle changes: None            Medications: Prilosec only works while taking it, antacids    Medication Followup of sertraline    Taking Medication as prescribed: yes    Side Effects:  Diarrhea a few times a week, even when taken with food    Medication Helping Symptoms:  yes       Depression and Anxiety Follow-Up    How are you doing with your depression since your last visit? No change    How are you doing with your anxiety since your last visit?  No change    Are you having other symptoms that might be associated with depression or anxiety? No    Have you had a significant life event? No     Do you have any concerns with your use of alcohol or other drugs? No    Social History     Tobacco Use     Smoking  status: Never     Smokeless tobacco: Never   Vaping Use     Vaping Use: Never used   Substance Use Topics     Alcohol use: Yes     Drug use: Never     PHQ 1/17/2022 11/30/2022 3/2/2023   PHQ-9 Total Score 2 3 4   Q9: Thoughts of better off dead/self-harm past 2 weeks Not at all Not at all Not at all   F/U: Thoughts of suicide or self-harm - - -   F/U: Safety concerns - - -     MARTHA-7 SCORE 11/15/2021 1/17/2022 3/2/2023   Total Score - 2 (minimal anxiety) 2 (minimal anxiety)   Total Score 4 2 2     Last PHQ-9 3/2/2023   1.  Little interest or pleasure in doing things 1   2.  Feeling down, depressed, or hopeless 0   3.  Trouble falling or staying asleep, or sleeping too much 0   4.  Feeling tired or having little energy 1   5.  Poor appetite or overeating 2   6.  Feeling bad about yourself 0   7.  Trouble concentrating 0   8.  Moving slowly or restless 0   Q9: Thoughts of better off dead/self-harm past 2 weeks 0   PHQ-9 Total Score 4   Difficulty at work, home, or with people -   In the past two weeks have you had thoughts of suicide or self harm? -   Do you have concerns about your personal safety or the safety of others? -     MARTHA-7  3/2/2023   1. Feeling nervous, anxious, or on edge 1   2. Not being able to stop or control worrying 0   3. Worrying too much about different things 1   4. Trouble relaxing 0   5. Being so restless that it is hard to sit still 0   6. Becoming easily annoyed or irritable 0   7. Feeling afraid, as if something awful might happen 0   MARTHA-7 Total Score 2   If you checked any problems, how difficult have they made it for you to do your work, take care of things at home, or get along with other people? Somewhat difficult       Suicide Assessment Five-step Evaluation and Treatment (SAFE-T)      Review of Systems   Psychiatric/Behavioral: The patient is nervous/anxious.       Constitutional, HEENT, cardiovascular, pulmonary, gi and gu systems are negative, except as otherwise noted.       Objective           Vitals:  No vitals were obtained today due to virtual visit.    Physical Exam   GENERAL: Healthy, alert and no distress  EYES: Eyes grossly normal to inspection.  No discharge or erythema, or obvious scleral/conjunctival abnormalities.  RESP: No audible wheeze, cough, or visible cyanosis.  No visible retractions or increased work of breathing.    SKIN: Visible skin clear. No significant rash, abnormal pigmentation or lesions.  NEURO: Cranial nerves grossly intact.  Mentation and speech appropriate for age.  PSYCH: Mentation appears normal, affect normal/bright, judgement and insight intact, normal speech and appearance well-groomed.                Video-Visit Details    Type of service:  Video Visit     Originating Location (pt. Location): Home    Distant Location (provider location):  On-site  Platform used for Video Visit: [employer]

## 2023-03-20 ENCOUNTER — MYC MEDICAL ADVICE (OUTPATIENT)
Dept: FAMILY MEDICINE | Facility: CLINIC | Age: 33
End: 2023-03-20
Payer: COMMERCIAL

## 2023-03-23 ENCOUNTER — VIRTUAL VISIT (OUTPATIENT)
Dept: FAMILY MEDICINE | Facility: CLINIC | Age: 33
End: 2023-03-23
Payer: COMMERCIAL

## 2023-03-23 DIAGNOSIS — F32.5 MAJOR DEPRESSIVE DISORDER WITH SINGLE EPISODE, IN FULL REMISSION (H): ICD-10-CM

## 2023-03-23 DIAGNOSIS — F42.2 MIXED OBSESSIONAL THOUGHTS AND ACTS: Primary | ICD-10-CM

## 2023-03-23 DIAGNOSIS — F41.9 ANXIETY: ICD-10-CM

## 2023-03-23 PROCEDURE — 99214 OFFICE O/P EST MOD 30 MIN: CPT | Mod: VID | Performed by: NURSE PRACTITIONER

## 2023-03-23 RX ORDER — FLUOXETINE 40 MG/1
40 CAPSULE ORAL DAILY
Qty: 90 CAPSULE | Refills: 3 | Status: SHIPPED | OUTPATIENT
Start: 2023-03-23 | End: 2023-11-21

## 2023-03-23 NOTE — PROGRESS NOTES
"Emmett is a 32 year old who is being evaluated via a billable video visit.      How would you like to obtain your AVS? MyChart  If the video visit is dropped, the invitation should be resent by: Send to e-mail at: [email or web address]  Will anyone else be joining your video visit? No          Assessment & Plan       ICD-10-CM    1. Mixed obsessional thoughts and acts  F42.2       2. Major depressive disorder with single episode, in full remission (H)  F32.5 FLUoxetine (PROZAC) 40 MG capsule      3. Anxiety  F41.9 FLUoxetine (PROZAC) 40 MG capsule        Increase fluoxetine today from 20mg to 40 mg a continue on Wellbutrin.  Follow-up as needed I anticipate this will continue to improve symptoms of anxiety and OCD.             BMI:   Estimated body mass index is 36.1 kg/m  as calculated from the following:    Height as of 5/2/22: 1.765 m (5' 9.5\").    Weight as of 5/2/22: 112.5 kg (248 lb).           Monika Strong, FIDEL [facility]   Emmett is a 32 year old, presenting for the following health issues:  Obsessive Compulsive Disorder and Health Maintenance (Advised patient of .)    Additional Questions 3/23/2023   Roomed by Tiffany Green CMA     History of Present Illness       Reason for visit:  OCD  Symptom onset:  More than a month  Symptoms include:  Checking/Doubts about locking doors or turning off appliances, frequent fear of causing an accident while drive  Symptom intensity:  Severe  Symptom progression:  Staying the same  Had these symptoms before:  Yes  Has tried/received treatment for these symptoms:  No  What makes it worse:  Stress/Anxiety, Over eating, being tired    He eats 2-3 servings of fruits and vegetables daily.He consumes 0 sweetened beverage(s) daily.He exercises with enough effort to increase his heart rate 30 to 60 minutes per day.  He exercises with enough effort to increase his heart rate 5 days per week.   He is taking medications " regularly.       Patient states that he has OCD symptoms. His therapist recommended seeing if he would be able to get medication for this. He is currently on fluoxetine and wellbutrin. He has noted improvement with symptoms of OCD with addition of the selective serotonin reuptake inhibitor.       Review of Systems   Constitutional, HEENT, cardiovascular, pulmonary, gi and gu systems are negative, except as otherwise noted.      Objective           Vitals:  No vitals were obtained today due to virtual visit.    Physical Exam   GENERAL: Healthy, alert and no distress  EYES: Eyes grossly normal to inspection.  No discharge or erythema, or obvious scleral/conjunctival abnormalities.  RESP: No audible wheeze, cough, or visible cyanosis.  No visible retractions or increased work of breathing.    SKIN: Visible skin clear. No significant rash, abnormal pigmentation or lesions.  NEURO: Cranial nerves grossly intact.  Mentation and speech appropriate for age.  PSYCH: Mentation appears normal, affect normal/bright, judgement and insight intact, normal speech and appearance well-groomed.      Video-Visit Details    Type of service:  Video Visit     Originating Location (pt. Location): Home    Distant Location (provider location):  On-site  Platform used for Video Visit: [employer]

## 2023-04-20 DIAGNOSIS — K21.9 GASTRO-ESOPHAGEAL REFLUX DISEASE WITHOUT ESOPHAGITIS: ICD-10-CM

## 2023-04-20 RX ORDER — FAMOTIDINE 40 MG/1
40 TABLET, FILM COATED ORAL
Qty: 30 TABLET | Refills: 2 | Status: SHIPPED | OUTPATIENT
Start: 2023-04-20 | End: 2024-05-30

## 2023-04-30 ENCOUNTER — HEALTH MAINTENANCE LETTER (OUTPATIENT)
Age: 33
End: 2023-04-30

## 2023-05-01 ENCOUNTER — TRANSFERRED RECORDS (OUTPATIENT)
Dept: HEALTH INFORMATION MANAGEMENT | Facility: CLINIC | Age: 33
End: 2023-05-01
Payer: COMMERCIAL

## 2023-07-19 ENCOUNTER — TRANSFERRED RECORDS (OUTPATIENT)
Dept: HEALTH INFORMATION MANAGEMENT | Facility: CLINIC | Age: 33
End: 2023-07-19
Payer: COMMERCIAL

## 2023-08-18 ENCOUNTER — TRANSFERRED RECORDS (OUTPATIENT)
Dept: HEALTH INFORMATION MANAGEMENT | Facility: CLINIC | Age: 33
End: 2023-08-18
Payer: COMMERCIAL

## 2023-09-15 ENCOUNTER — TRANSFERRED RECORDS (OUTPATIENT)
Dept: HEALTH INFORMATION MANAGEMENT | Facility: CLINIC | Age: 33
End: 2023-09-15
Payer: COMMERCIAL

## 2023-11-21 ENCOUNTER — VIRTUAL VISIT (OUTPATIENT)
Dept: FAMILY MEDICINE | Facility: CLINIC | Age: 33
End: 2023-11-21
Payer: COMMERCIAL

## 2023-11-21 DIAGNOSIS — F41.9 ANXIETY: ICD-10-CM

## 2023-11-21 DIAGNOSIS — F32.5 MAJOR DEPRESSIVE DISORDER, SINGLE EPISODE, IN REMISSION (H): Primary | ICD-10-CM

## 2023-11-21 PROBLEM — F32.1 CURRENT MODERATE EPISODE OF MAJOR DEPRESSIVE DISORDER WITHOUT PRIOR EPISODE (H): Status: ACTIVE | Noted: 2021-02-26

## 2023-11-21 PROBLEM — F32.1 CURRENT MODERATE EPISODE OF MAJOR DEPRESSIVE DISORDER WITHOUT PRIOR EPISODE (H): Status: RESOLVED | Noted: 2021-02-26 | Resolved: 2023-11-21

## 2023-11-21 PROBLEM — F32.4 MAJOR DEPRESSIVE DISORDER WITH SINGLE EPISODE, IN PARTIAL REMISSION (H): Status: RESOLVED | Noted: 2021-02-26 | Resolved: 2023-11-21

## 2023-11-21 PROCEDURE — 99213 OFFICE O/P EST LOW 20 MIN: CPT | Mod: VID | Performed by: NURSE PRACTITIONER

## 2023-11-21 RX ORDER — FLUOXETINE 40 MG/1
40 CAPSULE ORAL DAILY
Qty: 90 CAPSULE | Refills: 3 | Status: SHIPPED | OUTPATIENT
Start: 2023-11-21 | End: 2024-09-06

## 2023-11-21 RX ORDER — BUPROPION HYDROCHLORIDE 150 MG/1
150 TABLET ORAL EVERY MORNING
Qty: 90 TABLET | Refills: 2 | Status: SHIPPED | OUTPATIENT
Start: 2023-11-21

## 2023-11-21 ASSESSMENT — ANXIETY QUESTIONNAIRES
GAD7 TOTAL SCORE: 2
2. NOT BEING ABLE TO STOP OR CONTROL WORRYING: SEVERAL DAYS
GAD7 TOTAL SCORE: 2
6. BECOMING EASILY ANNOYED OR IRRITABLE: NOT AT ALL
IF YOU CHECKED OFF ANY PROBLEMS ON THIS QUESTIONNAIRE, HOW DIFFICULT HAVE THESE PROBLEMS MADE IT FOR YOU TO DO YOUR WORK, TAKE CARE OF THINGS AT HOME, OR GET ALONG WITH OTHER PEOPLE: SOMEWHAT DIFFICULT
1. FEELING NERVOUS, ANXIOUS, OR ON EDGE: SEVERAL DAYS
7. FEELING AFRAID AS IF SOMETHING AWFUL MIGHT HAPPEN: NOT AT ALL
3. WORRYING TOO MUCH ABOUT DIFFERENT THINGS: NOT AT ALL
5. BEING SO RESTLESS THAT IT IS HARD TO SIT STILL: NOT AT ALL

## 2023-11-21 ASSESSMENT — PATIENT HEALTH QUESTIONNAIRE - PHQ9
SUM OF ALL RESPONSES TO PHQ QUESTIONS 1-9: 4
5. POOR APPETITE OR OVEREATING: NOT AT ALL

## 2023-11-21 NOTE — PROGRESS NOTES
Emmett is a 32 year old who is being evaluated via a billable video visit.      How would you like to obtain your AVS? MyChart  If the video visit is dropped, the invitation should be resent by: Text to cell phone: [phone]  Will anyone else be joining your video visit? No        Assessment & Plan     Major depressive disorder, single episode, in remission (H24)  Doing well overall with mental health management. Continue current regimen. Refills provided.   - buPROPion (WELLBUTRIN XL) 150 MG 24 hr tablet; Take 1 tablet (150 mg) by mouth every morning  - FLUoxetine (PROZAC) 40 MG capsule; Take 1 capsule (40 mg) by mouth daily    Anxiety  - FLUoxetine (PROZAC) 40 MG capsule; Take 1 capsule (40 mg) by mouth daily      FIDEL Tsang [facility]   Emmett is a 32 year old, presenting for the following health issues:   Follow Up and Recheck Medication        11/21/2023     7:29 AM   Additional Questions   Roomed by Janessa SIMON MA   Accompanied by Self       HPI     Depression and Anxiety Follow-Up  How are you doing with your depression since your last visit? No change  How are you doing with your anxiety since your last visit?  No change  Are you having other symptoms that might be associated with depression or anxiety? Yes:     Have you had a significant life event? No   Do you have any concerns with your use of alcohol or other drugs? No    Social History     Tobacco Use    Smoking status: Never    Smokeless tobacco: Never   Vaping Use    Vaping Use: Never used   Substance Use Topics    Alcohol use: Yes    Drug use: Never         11/30/2022     1:36 PM 3/2/2023     7:21 PM 11/21/2023     7:31 AM   PHQ   PHQ-9 Total Score 3 4 4   Q9: Thoughts of better off dead/self-harm past 2 weeks Not at all Not at all Not at all         1/17/2022     4:54 PM 3/2/2023     7:21 PM 11/21/2023     7:33 AM   MARTHA-7 SCORE   Total Score 2 (minimal anxiety) 2 (minimal anxiety)    Total Score  2 2 2     ED symptoms resolved with use of wellbutrin. Stable mental health.    Review of Systems   Constitutional, HEENT, cardiovascular, pulmonary, gi and gu systems are negative, except as otherwise noted.      Objective           Vitals:  No vitals were obtained today due to virtual visit.    Physical Exam   GENERAL: Healthy, alert and no distress  EYES: Eyes grossly normal to inspection.  No discharge or erythema, or obvious scleral/conjunctival abnormalities.  RESP: No audible wheeze, cough, or visible cyanosis.  No visible retractions or increased work of breathing.    SKIN: Visible skin clear. No significant rash, abnormal pigmentation or lesions.  NEURO: Cranial nerves grossly intact.  Mentation and speech appropriate for age.  PSYCH: Mentation appears normal, affect normal/bright, judgement and insight intact, normal speech and appearance well-groomed.              Video-Visit Details    Type of service:  Video Visit     Originating Location (pt. Location): Home    Distant Location (provider location):  On-site  Platform used for Video Visit: [employer]

## 2023-12-12 ENCOUNTER — OFFICE VISIT (OUTPATIENT)
Dept: FAMILY MEDICINE | Facility: CLINIC | Age: 33
End: 2023-12-12
Payer: COMMERCIAL

## 2023-12-12 VITALS
HEART RATE: 71 BPM | DIASTOLIC BLOOD PRESSURE: 80 MMHG | TEMPERATURE: 98.8 F | RESPIRATION RATE: 18 BRPM | OXYGEN SATURATION: 98 % | HEIGHT: 70 IN | WEIGHT: 257.6 LBS | BODY MASS INDEX: 36.88 KG/M2 | SYSTOLIC BLOOD PRESSURE: 123 MMHG

## 2023-12-12 DIAGNOSIS — R09.81 NASAL CONGESTION: ICD-10-CM

## 2023-12-12 DIAGNOSIS — R07.0 THROAT PAIN: Primary | ICD-10-CM

## 2023-12-12 LAB
DEPRECATED S PYO AG THROAT QL EIA: NEGATIVE
GROUP A STREP BY PCR: NOT DETECTED

## 2023-12-12 PROCEDURE — 99213 OFFICE O/P EST LOW 20 MIN: CPT | Performed by: PHYSICIAN ASSISTANT

## 2023-12-12 PROCEDURE — 87651 STREP A DNA AMP PROBE: CPT | Performed by: PHYSICIAN ASSISTANT

## 2023-12-12 RX ORDER — OMEPRAZOLE 40 MG/1
CAPSULE, DELAYED RELEASE ORAL
COMMUNITY
Start: 2023-12-07

## 2023-12-12 NOTE — PROGRESS NOTES
"  Assessment & Plan     Throat pain  Educated patient, rapid strep is negative. Sending out for culture. This should be back by tomorrow. This is likely viral pharyngitis and should improve over the next week. Symptomatic care with rest, fluids, ibuprofen, lozenges and salt water gargles recommended.Follow up in clinic if symptoms worsen or change Patient agree's with this plan and has no further questions  - Streptococcus A Rapid Screen w/Reflex to PCR - Clinic Collect  - Group A Streptococcus PCR Throat Swab    Nasal congestion  Recommend nasal sprays such as saline spray or Flonase.            BMI:   Estimated body mass index is 37.5 kg/m  as calculated from the following:    Height as of this encounter: 1.765 m (5' 9.5\").    Weight as of this encounter: 116.8 kg (257 lb 9.6 oz).         MICHEAL Coffey  [facility]    Bart Christine is a 33 year old, presenting for the following health issues:  URI      History of Present Illness       Reason for visit:  Severe sore thoat with sudden onset  Symptom onset:  1-3 days ago  Symptoms include:  Sore throat, pain when swallowing, slightly congested nasal passages  Symptom intensity:  Severe  Symptom progression:  Worsening  Had these symptoms before:  No    He eats 2-3 servings of fruits and vegetables daily.He consumes 0 sweetened beverage(s) daily.He exercises with enough effort to increase his heart rate 30 to 60 minutes per day.  He exercises with enough effort to increase his heart rate 4 days per week.   He is taking medications regularly.       Acute Illness  Acute illness concerns: sore throat, congestion  Onset/Duration: 24 hrs   Symptoms:  Fever: No  Chills/Sweats: No  Headache (location?): No  Sinus Pressure: yes, mild  Conjunctivitis:  No  Ear Pain: no  Rhinorrhea: YES  Congestion: YES  Sore Throat: YES  Cough: no  Wheeze: No  Decreased Appetite: No  Nausea: No  Vomiting: No  Diarrhea: No  Dysuria/Freq.: No  Dysuria or " "Hematuria: No  Fatigue/Achiness: Yes, fatigue  Sick/Strep Exposure: No  Therapies tried and outcome: ibuprofen and cough drops, which kind of helps  No at home COVID test    Review of Systems   Constitutional, HEENT, cardiovascular, pulmonary, gi and gu systems are negative, except as otherwise noted.      Objective    /80   Pulse 71   Temp 98.8  F (37.1  C) (Oral)   Resp 18   Ht 1.765 m (5' 9.5\")   Wt 116.8 kg (257 lb 9.6 oz)   SpO2 98%   BMI 37.50 kg/m    Body mass index is 37.5 kg/m .  Physical Exam  Constitutional:       Appearance: Normal appearance.   HENT:      Head: Normocephalic.      Right Ear: Tympanic membrane and external ear normal.      Left Ear: Tympanic membrane and external ear normal.      Nose: Congestion and rhinorrhea present.      Mouth/Throat:      Mouth: Mucous membranes are moist.      Pharynx: Posterior oropharyngeal erythema present. No oropharyngeal exudate.      Comments: Postnasal drainage, tonsils absent  Cardiovascular:      Rate and Rhythm: Normal rate and regular rhythm.   Pulmonary:      Effort: Pulmonary effort is normal.      Breath sounds: Normal breath sounds. No wheezing.   Lymphadenopathy:      Cervical: No cervical adenopathy.   Neurological:      Mental Status: He is alert.   Psychiatric:         Mood and Affect: Mood normal.         Behavior: Behavior normal.            STREP RESULTS :NEGATIVE              "

## 2023-12-12 NOTE — PATIENT INSTRUCTIONS
Your rapid strep is negative. Sending out for culture. This should be back by tomorrow. This is likely viral pharyngitis and should improve over the next week. Symptomatic care with rest, fluids, ibuprofen, lozenges and salt water gargles recommended.Follow up in clinic if symptoms worsen or change.

## 2024-05-30 ENCOUNTER — OFFICE VISIT (OUTPATIENT)
Dept: FAMILY MEDICINE | Facility: CLINIC | Age: 34
End: 2024-05-30
Payer: COMMERCIAL

## 2024-05-30 VITALS
TEMPERATURE: 97 F | HEART RATE: 67 BPM | RESPIRATION RATE: 16 BRPM | WEIGHT: 238 LBS | SYSTOLIC BLOOD PRESSURE: 124 MMHG | OXYGEN SATURATION: 98 % | DIASTOLIC BLOOD PRESSURE: 78 MMHG | BODY MASS INDEX: 34.64 KG/M2

## 2024-05-30 DIAGNOSIS — B35.1 ONYCHOMYCOSIS: ICD-10-CM

## 2024-05-30 DIAGNOSIS — Z01.818 PREOP GENERAL PHYSICAL EXAM: Primary | ICD-10-CM

## 2024-05-30 DIAGNOSIS — Z13.220 SCREENING FOR HYPERLIPIDEMIA: ICD-10-CM

## 2024-05-30 DIAGNOSIS — S83.242A TEAR OF MEDIAL MENISCUS OF LEFT KNEE, CURRENT, UNSPECIFIED TEAR TYPE, INITIAL ENCOUNTER: ICD-10-CM

## 2024-05-30 LAB
ALBUMIN SERPL BCG-MCNC: 4.8 G/DL (ref 3.5–5.2)
ALP SERPL-CCNC: 54 U/L (ref 40–150)
ALT SERPL W P-5'-P-CCNC: 24 U/L (ref 0–70)
ANION GAP SERPL CALCULATED.3IONS-SCNC: 13 MMOL/L (ref 7–15)
AST SERPL W P-5'-P-CCNC: 37 U/L (ref 0–45)
BILIRUB SERPL-MCNC: 0.9 MG/DL
BUN SERPL-MCNC: 15.5 MG/DL (ref 6–20)
CALCIUM SERPL-MCNC: 10.2 MG/DL (ref 8.6–10)
CHLORIDE SERPL-SCNC: 103 MMOL/L (ref 98–107)
CHOLEST SERPL-MCNC: 206 MG/DL
CREAT SERPL-MCNC: 1.16 MG/DL (ref 0.67–1.17)
DEPRECATED HCO3 PLAS-SCNC: 25 MMOL/L (ref 22–29)
EGFRCR SERPLBLD CKD-EPI 2021: 86 ML/MIN/1.73M2
FASTING STATUS PATIENT QL REPORTED: YES
FASTING STATUS PATIENT QL REPORTED: YES
GLUCOSE SERPL-MCNC: 82 MG/DL (ref 70–99)
HDLC SERPL-MCNC: 48 MG/DL
HGB BLD-MCNC: 14.4 G/DL (ref 13.3–17.7)
HOLD SPECIMEN: NORMAL
LDLC SERPL CALC-MCNC: 141 MG/DL
NONHDLC SERPL-MCNC: 158 MG/DL
POTASSIUM SERPL-SCNC: 4.9 MMOL/L (ref 3.4–5.3)
PROT SERPL-MCNC: 7.7 G/DL (ref 6.4–8.3)
SODIUM SERPL-SCNC: 141 MMOL/L (ref 135–145)
TRIGL SERPL-MCNC: 87 MG/DL

## 2024-05-30 PROCEDURE — 80053 COMPREHEN METABOLIC PANEL: CPT | Performed by: NURSE PRACTITIONER

## 2024-05-30 PROCEDURE — 85018 HEMOGLOBIN: CPT | Performed by: NURSE PRACTITIONER

## 2024-05-30 PROCEDURE — 80061 LIPID PANEL: CPT | Performed by: NURSE PRACTITIONER

## 2024-05-30 PROCEDURE — 99214 OFFICE O/P EST MOD 30 MIN: CPT | Performed by: NURSE PRACTITIONER

## 2024-05-30 PROCEDURE — G2211 COMPLEX E/M VISIT ADD ON: HCPCS | Performed by: NURSE PRACTITIONER

## 2024-05-30 PROCEDURE — 36415 COLL VENOUS BLD VENIPUNCTURE: CPT | Performed by: NURSE PRACTITIONER

## 2024-05-30 RX ORDER — TERBINAFINE HYDROCHLORIDE 250 MG/1
250 TABLET ORAL DAILY
Qty: 90 TABLET | Refills: 0 | Status: SHIPPED | OUTPATIENT
Start: 2024-05-30 | End: 2024-09-01

## 2024-05-30 ASSESSMENT — PAIN SCALES - GENERAL: PAINLEVEL: NO PAIN (0)

## 2024-05-30 NOTE — PROGRESS NOTES
Preoperative Evaluation  [facility]  [address]  [facility] [address]  Phone: [phone]  Primary Provider: FIDEL Tsang CNP  Pre-op Performing Provider: FIDEL Tsang CNP  May 30, 2024             5/28/2024   Surgical Information   What procedure is being done? Knee arthoscopy-Left   Facility or Hospital where procedure/surgery will be performed: [facility]   Who is doing the procedure / surgery? Noé Vicky   Date of surgery / procedure: 12 Jul 2024   Time of surgery / procedure: 12:00   Where do you plan to recover after surgery? at home with family     Fax number for surgical facility: [phone]    Assessment & Plan     The proposed surgical procedure is considered INTERMEDIATE risk.    Preop general physical exam  Tear of medial meniscus of left knee, current, unspecified tear type, initial encounter  Preoperative exam completed today for upcoming repair of meniscus tear left. He is low risk for planned procedure. Okay to proceed without further clinical clarification.   - Hemoglobin with Reflex to Iron Studies; Future  - Hemoglobin with Reflex to Iron Studies    Onychomycosis  Will treat with oral antifungal. He has failed conservative management. Will check liver function.   - Comprehensive metabolic panel (BMP + Alb, Alk Phos, ALT, AST, Total. Bili, TP); Future  - terbinafine (LAMISIL) 250 MG tablet; Take 1 tablet (250 mg) by mouth daily for 90 days  - Comprehensive metabolic panel (BMP + Alb, Alk Phos, ALT, AST, Total. Bili, TP)    Screening for hyperlipidemia  - Lipid panel reflex to direct LDL Fasting; Future  - Lipid panel reflex to direct LDL Fasting       - No identified additional risk factors other than previously addressed    Preoperative Medication Instructions  Antiplatelet or Anticoagulation Medication Instructions   - Patient is on no antiplatelet or anticoagulation medications.    Additional Medication  Instructions  Take all scheduled medications on the day of surgery    Recommendation  Approval given to proceed with proposed procedure, without further diagnostic evaluation.        Bart Christine is a 33 year old, presenting for the following:  Pre-Op Exam          5/30/2024     6:59 AM   Additional Questions   Roomed by JUSTIN Mcleod related to upcoming procedure: meniscus tear of the left knee.         5/28/2024   Pre-Op Questionnaire   Have you ever had a heart attack or stroke? No   Have you ever had surgery on your heart or blood vessels, such as a stent placement, a coronary artery bypass, or surgery on an artery in your head, neck, heart, or legs? No   Do you have chest pain with activity? No   Do you have a history of heart failure? No   Do you currently have a cold, bronchitis or symptoms of other infection? No   Do you have a cough, shortness of breath, or wheezing? No   Do you or anyone in your family have previous history of blood clots? No   Do you or does anyone in your family have a serious bleeding problem such as prolonged bleeding following surgeries or cuts? No   Have you ever had problems with anemia or been told to take iron pills? No   Have you had any abnormal blood loss such as black, tarry or bloody stools? No   Have you ever had a blood transfusion? No   Are you willing to have a blood transfusion if it is medically needed before, during, or after your surgery? Yes   Have you or any of your relatives ever had problems with anesthesia? No   Do you have sleep apnea, excessive snoring or daytime drowsiness? No   Do you have any artifical heart valves or other implanted medical devices like a pacemaker, defibrillator, or continuous glucose monitor? No   Do you have artificial joints? No   Are you allergic to latex? No     Health Care Directive  Patient does not have a Health Care Directive or Living Will: Discussed advance care planning with patient; however, patient declined at this  time.    Preoperative Review of    reviewed - no record of controlled substances prescribed.      Status of Chronic Conditions:  See problem list for active medical problems.  Problems all longstanding and stable, except as noted/documented.  See ROS for pertinent symptoms related to these conditions.    Patient Active Problem List    Diagnosis Date Noted    Anxiety 02/26/2021     Priority: Medium    Acne 12/28/2020     Priority: Medium     Created by Conversion      Nail fungus 04/05/2018     Priority: Medium    Raynaud's phenomenon without gangrene 04/05/2018     Priority: Medium      Past Medical History:   Diagnosis Date    Major depressive disorder with single episode, in partial remission (H24)      Past Surgical History:   Procedure Laterality Date    AS RAD RESEC TONSIL/PILLARS      ENT SURGERY      ORTHOPEDIC SURGERY      Maniscus Repair (Right Knee)     Current Outpatient Medications   Medication Sig Dispense Refill    buPROPion (WELLBUTRIN XL) 150 MG 24 hr tablet Take 1 tablet (150 mg) by mouth every morning 90 tablet 2    fish oil-omega-3 fatty acids 1000 MG capsule Take 2 g by mouth      FLUoxetine (PROZAC) 40 MG capsule Take 1 capsule (40 mg) by mouth daily 90 capsule 3    omeprazole (PRILOSEC) 40 MG DR capsule       ONE DAILY MULTIPLE VITAMIN OR       terbinafine (LAMISIL) 250 MG tablet Take 1 tablet (250 mg) by mouth daily for 90 days 90 tablet 0       Allergies   Allergen Reactions    Sulfa Antibiotics Other (See Comments) and Rash     Allergy testing done          Social History     Tobacco Use    Smoking status: Never     Passive exposure: Never    Smokeless tobacco: Never   Substance Use Topics    Alcohol use: Yes     Family History   Problem Relation Age of Onset    No Known Problems Mother     No Known Problems Father     Other Cancer Maternal Grandmother         Ovarian Cancer    Heart Disease Maternal Grandfather     Coronary Artery Disease Maternal Grandfather     Heart Disease  "Paternal Grandfather     Coronary Artery Disease Paternal Grandfather      History   Drug Use Unknown             Review of Systems  Constitutional, neuro, ENT, endocrine, pulmonary, cardiac, gastrointestinal, genitourinary, musculoskeletal, integument and psychiatric systems are negative, except as otherwise noted.    Objective    /78   Pulse 67   Temp 97  F (36.1  C) (Tympanic)   Resp 16   Wt 108 kg (238 lb)   SpO2 98%   BMI 34.64 kg/m     Estimated body mass index is 34.64 kg/m  as calculated from the following:    Height as of 12/12/23: 1.765 m (5' 9.5\").    Weight as of this encounter: 108 kg (238 lb).  Physical Exam  GENERAL: alert and no distress  EYES: Eyes grossly normal to inspection, PERRL and conjunctivae and sclerae normal  HENT: ear canals and TM's normal, nose and mouth without ulcers or lesions  NECK: no adenopathy, no asymmetry, masses, or scars  RESP: lungs clear to auscultation - no rales, rhonchi or wheezes  CV: regular rate and rhythm, normal S1 S2, no S3 or S4, no murmur, click or rub, no peripheral edema  ABDOMEN: soft, nontender, no hepatosplenomegaly, no masses and bowel sounds normal  MS: no gross musculoskeletal defects noted, no edema  SKIN: no suspicious lesions or rashes  NEURO: Normal strength and tone, mentation intact and speech normal  PSYCH: mentation appears normal, affect normal/bright    No results for input(s): \"HGB\", \"PLT\", \"INR\", \"NA\", \"POTASSIUM\", \"CR\", \"A1C\" in the last 8760 hours.     Diagnostics  Labs pending at this time.  Results will be reviewed when available.   No EKG required, no history of coronary heart disease, significant arrhythmia, peripheral arterial disease or other structural heart disease.    Revised Cardiac Risk Index (RCRI)  The patient has the following serious cardiovascular risks for perioperative complications:   - No serious cardiac risks = 0 points     RCRI Interpretation: 0 points: Class I (very low risk - 0.4% complication rate)     "     Signed Electronically by: FIDEL Tsang CNP  Copy of this evaluation report is provided to requesting physician.

## 2024-07-01 ENCOUNTER — OFFICE VISIT (OUTPATIENT)
Dept: FAMILY MEDICINE | Facility: CLINIC | Age: 34
End: 2024-07-01
Payer: COMMERCIAL

## 2024-07-01 VITALS
WEIGHT: 242.8 LBS | TEMPERATURE: 97.1 F | DIASTOLIC BLOOD PRESSURE: 78 MMHG | HEART RATE: 64 BPM | OXYGEN SATURATION: 99 % | RESPIRATION RATE: 16 BRPM | HEIGHT: 69 IN | SYSTOLIC BLOOD PRESSURE: 112 MMHG | BODY MASS INDEX: 35.96 KG/M2

## 2024-07-01 DIAGNOSIS — Z79.899 MEDICATION MANAGEMENT: ICD-10-CM

## 2024-07-01 DIAGNOSIS — Z01.818 PREOP GENERAL PHYSICAL EXAM: Primary | ICD-10-CM

## 2024-07-01 DIAGNOSIS — S83.242A TEAR OF MEDIAL MENISCUS OF LEFT KNEE, CURRENT, UNSPECIFIED TEAR TYPE, INITIAL ENCOUNTER: ICD-10-CM

## 2024-07-01 LAB
ALBUMIN SERPL BCG-MCNC: 4.6 G/DL (ref 3.5–5.2)
ALP SERPL-CCNC: 51 U/L (ref 40–150)
ALT SERPL W P-5'-P-CCNC: 25 U/L (ref 0–70)
ANION GAP SERPL CALCULATED.3IONS-SCNC: 11 MMOL/L (ref 7–15)
AST SERPL W P-5'-P-CCNC: 32 U/L (ref 0–45)
BILIRUB DIRECT SERPL-MCNC: <0.2 MG/DL (ref 0–0.3)
BILIRUB SERPL-MCNC: 0.6 MG/DL
BUN SERPL-MCNC: 18.7 MG/DL (ref 6–20)
CALCIUM SERPL-MCNC: 9.4 MG/DL (ref 8.6–10)
CHLORIDE SERPL-SCNC: 102 MMOL/L (ref 98–107)
CREAT SERPL-MCNC: 0.99 MG/DL (ref 0.67–1.17)
DEPRECATED HCO3 PLAS-SCNC: 26 MMOL/L (ref 22–29)
EGFRCR SERPLBLD CKD-EPI 2021: >90 ML/MIN/1.73M2
GLUCOSE SERPL-MCNC: 89 MG/DL (ref 70–99)
HGB BLD-MCNC: 13.9 G/DL (ref 13.3–17.7)
HOLD SPECIMEN: NORMAL
PHOSPHATE SERPL-MCNC: 3.2 MG/DL (ref 2.5–4.5)
POTASSIUM SERPL-SCNC: 4.7 MMOL/L (ref 3.4–5.3)
PROT SERPL-MCNC: 7 G/DL (ref 6.4–8.3)
SODIUM SERPL-SCNC: 139 MMOL/L (ref 135–145)

## 2024-07-01 PROCEDURE — 99214 OFFICE O/P EST MOD 30 MIN: CPT | Performed by: NURSE PRACTITIONER

## 2024-07-01 PROCEDURE — 82248 BILIRUBIN DIRECT: CPT | Performed by: NURSE PRACTITIONER

## 2024-07-01 PROCEDURE — 85018 HEMOGLOBIN: CPT | Performed by: NURSE PRACTITIONER

## 2024-07-01 PROCEDURE — 80053 COMPREHEN METABOLIC PANEL: CPT | Performed by: NURSE PRACTITIONER

## 2024-07-01 PROCEDURE — 84100 ASSAY OF PHOSPHORUS: CPT | Performed by: NURSE PRACTITIONER

## 2024-07-01 PROCEDURE — 36415 COLL VENOUS BLD VENIPUNCTURE: CPT | Performed by: NURSE PRACTITIONER

## 2024-07-01 ASSESSMENT — PAIN SCALES - GENERAL: PAINLEVEL: NO PAIN (0)

## 2024-07-01 NOTE — PROGRESS NOTES
Preoperative Evaluation  [facility]  [address]  [facility] [address]  Phone: [phone]  Primary Provider: FIDEL Tsang CNP  Pre-op Performing Provider: FIDEL Tsang CNP  Jul 1, 2024 6/28/2024   Surgical Information   What procedure is being done? Knee arthroscopy   Facility or Hospital where procedure/surgery will be performed: [facility]   Who is doing the procedure / surgery? Noé Perry   Date of surgery / procedure: 12 Jul 2024   Time of surgery / procedure: 12pm   Where do you plan to recover after surgery? at home with family        Fax number for surgical facility: [phone]    Assessment & Plan     The proposed surgical procedure is considered INTERMEDIATE risk.      ICD-10-CM    1. Preop general physical exam  Z01.818 Hemoglobin with Reflex to Iron Studies      2. Tear of medial meniscus of left knee, current, unspecified tear type, initial encounter  S83.242A       3. Medication management  Z79.899 Hepatic panel (Albumin, ALT, AST, Bili, Alk Phos, TP)     Renal panel (Alb, BUN, Ca, Cl, CO2, Creat, Gluc, Phos, K, Na)        Preoperative exam completed today for upcoming repair of meniscus tear left. He is low risk for planned procedure. Okay to proceed without further clinical clarification.          - No identified additional risk factors other than previously addressed         Recommendation  Approval given to proceed with proposed procedure, without further diagnostic evaluation.    Bart Christine is a 33 year old, presenting for the following:  Pre-Op Exam          7/1/2024     7:50 AM   Additional Questions   Roomed by rmb   Accompanied by self         7/1/2024     7:50 AM   Patient Reported Additional Medications   Patient reports taking the following new medications none     HPI related to upcoming procedure: Torn mensicus left knee.         6/28/2024   Pre-Op Questionnaire   Have you ever had a heart  attack or stroke? No   Have you ever had surgery on your heart or blood vessels, such as a stent placement, a coronary artery bypass, or surgery on an artery in your head, neck, heart, or legs? No   Do you have chest pain with activity? No   Do you have a history of heart failure? No   Do you currently have a cold, bronchitis or symptoms of other infection? No   Do you have a cough, shortness of breath, or wheezing? No   Do you or anyone in your family have previous history of blood clots? No   Do you or does anyone in your family have a serious bleeding problem such as prolonged bleeding following surgeries or cuts? No   Have you ever had problems with anemia or been told to take iron pills? No   Have you had any abnormal blood loss such as black, tarry or bloody stools? No   Have you ever had a blood transfusion? No   Are you willing to have a blood transfusion if it is medically needed before, during, or after your surgery? Yes   Have you or any of your relatives ever had problems with anesthesia? No   Do you have sleep apnea, excessive snoring or daytime drowsiness? No   Do you have any artifical heart valves or other implanted medical devices like a pacemaker, defibrillator, or continuous glucose monitor? No   Do you have artificial joints? No   Are you allergic to latex? No        Health Care Directive  Patient does not have a Health Care Directive or Living Will: Discussed advance care planning with patient; however, patient declined at this time.    Preoperative Review of    reviewed - no record of controlled substances prescribed.      Status of Chronic Conditions:  See problem list for active medical problems.  Problems all longstanding and stable, except as noted/documented.  See ROS for pertinent symptoms related to these conditions.    Patient Active Problem List    Diagnosis Date Noted    Anxiety 02/26/2021     Priority: Medium    Acne 12/28/2020     Priority: Medium     Created by Conversion    "   Nail fungus 04/05/2018     Priority: Medium    Raynaud's phenomenon without gangrene 04/05/2018     Priority: Medium      Past Medical History:   Diagnosis Date    Major depressive disorder with single episode, in partial remission (H24)      Past Surgical History:   Procedure Laterality Date    AS RAD RESEC TONSIL/PILLARS      ENT SURGERY      ORTHOPEDIC SURGERY      Maniscus Repair (Right Knee)     Current Outpatient Medications   Medication Sig Dispense Refill    buPROPion (WELLBUTRIN XL) 150 MG 24 hr tablet Take 1 tablet (150 mg) by mouth every morning 90 tablet 2    fish oil-omega-3 fatty acids 1000 MG capsule Take 2 g by mouth      FLUoxetine (PROZAC) 40 MG capsule Take 1 capsule (40 mg) by mouth daily 90 capsule 3    omeprazole (PRILOSEC) 40 MG DR capsule       ONE DAILY MULTIPLE VITAMIN OR       terbinafine (LAMISIL) 250 MG tablet Take 1 tablet (250 mg) by mouth daily for 90 days 90 tablet 0       Allergies   Allergen Reactions    Sulfa Antibiotics Other (See Comments) and Rash     Allergy testing done          Social History     Tobacco Use    Smoking status: Never     Passive exposure: Never    Smokeless tobacco: Never   Substance Use Topics    Alcohol use: Yes     Family History   Problem Relation Age of Onset    No Known Problems Mother     No Known Problems Father     Other Cancer Maternal Grandmother         Ovarian Cancer    Heart Disease Maternal Grandfather     Coronary Artery Disease Maternal Grandfather     Heart Disease Paternal Grandfather     Coronary Artery Disease Paternal Grandfather      History   Drug Use Unknown           Review of Systems  Constitutional, neuro, ENT, endocrine, pulmonary, cardiac, gastrointestinal, genitourinary, musculoskeletal, integument and psychiatric systems are negative, except as otherwise noted.    Objective    /78   Pulse 64   Temp 97.1  F (36.2  C) (Tympanic)   Resp 16   Ht 1.753 m (5' 9\")   Wt 110.1 kg (242 lb 12.8 oz)   SpO2 99%   BMI 35.86 " "kg/m     Estimated body mass index is 35.86 kg/m  as calculated from the following:    Height as of this encounter: 1.753 m (5' 9\").    Weight as of this encounter: 110.1 kg (242 lb 12.8 oz).    Physical Exam  GENERAL: alert and no distress  EYES: Eyes grossly normal to inspection, PERRL and conjunctivae and sclerae normal  HENT: ear canals and TM's normal, nose and mouth without ulcers or lesions  NECK: no adenopathy, no asymmetry, masses, or scars  RESP: lungs clear to auscultation - no rales, rhonchi or wheezes  CV: regular rate and rhythm, normal S1 S2, no S3 or S4, no murmur, click or rub, no peripheral edema  ABDOMEN: soft, nontender, no hepatosplenomegaly, no masses and bowel sounds normal  MS: no gross musculoskeletal defects noted, no edema  SKIN: no suspicious lesions or rashes  NEURO: Normal strength and tone, mentation intact and speech normal  PSYCH: mentation appears normal, affect normal/bright  LYMPH: no cervical, supraclavicular, axillary, or inguinal adenopathy    Recent Labs   Lab Test 05/30/24  0734   HGB 14.4      POTASSIUM 4.9   CR 1.16        Diagnostics  No labs were ordered during this visit.   No EKG required, no history of coronary heart disease, significant arrhythmia, peripheral arterial disease or other structural heart disease.    Revised Cardiac Risk Index (RCRI)  The patient has the following serious cardiovascular risks for perioperative complications:   - No serious cardiac risks = 0 points     RCRI Interpretation: 0 points: Class I (very low risk - 0.4% complication rate)         Signed Electronically by: FIDEL Tsang CNP  Copy of this evaluation report is provided to requesting physician.         "

## 2024-07-01 NOTE — PATIENT INSTRUCTIONS

## 2024-07-07 ENCOUNTER — HEALTH MAINTENANCE LETTER (OUTPATIENT)
Age: 34
End: 2024-07-07

## 2024-09-01 ENCOUNTER — MYC REFILL (OUTPATIENT)
Dept: FAMILY MEDICINE | Facility: CLINIC | Age: 34
End: 2024-09-01
Payer: COMMERCIAL

## 2024-09-01 DIAGNOSIS — B35.1 ONYCHOMYCOSIS: ICD-10-CM

## 2024-09-03 RX ORDER — TERBINAFINE HYDROCHLORIDE 250 MG/1
250 TABLET ORAL DAILY
Qty: 90 TABLET | Refills: 0 | Status: SHIPPED | OUTPATIENT
Start: 2024-09-03

## 2024-09-06 ENCOUNTER — VIRTUAL VISIT (OUTPATIENT)
Dept: FAMILY MEDICINE | Facility: CLINIC | Age: 34
End: 2024-09-06
Payer: COMMERCIAL

## 2024-09-06 DIAGNOSIS — R61 GENERALIZED HYPERHIDROSIS: ICD-10-CM

## 2024-09-06 DIAGNOSIS — R41.840 CONCENTRATION DEFICIT: ICD-10-CM

## 2024-09-06 DIAGNOSIS — F32.5 MAJOR DEPRESSIVE DISORDER WITH SINGLE EPISODE, IN FULL REMISSION (H): ICD-10-CM

## 2024-09-06 DIAGNOSIS — F41.9 ANXIETY: Primary | ICD-10-CM

## 2024-09-06 PROCEDURE — 99214 OFFICE O/P EST MOD 30 MIN: CPT | Mod: 95 | Performed by: NURSE PRACTITIONER

## 2024-09-06 PROCEDURE — G2211 COMPLEX E/M VISIT ADD ON: HCPCS | Mod: 95 | Performed by: NURSE PRACTITIONER

## 2024-09-06 NOTE — PROGRESS NOTES
"Emmett is a 33 year old who is being evaluated via a billable video visit.    How would you like to obtain your AVS? MyChart  If the video visit is dropped, the invitation should be resent by: Send to e-mail at: [email or web address]  Will anyone else be joining your video visit? No      Assessment & Plan     Anxiety  Doing well overall. Will reduce dose to see if this improves symptoms of sweating.   - FLUoxetine (PROZAC) 20 MG capsule; Take 1 capsule (20 mg) by mouth daily.    Major depressive disorder with single episode, in full remission (H24)  See above  - FLUoxetine (PROZAC) 20 MG capsule; Take 1 capsule (20 mg) by mouth daily.    Generalized hyperhidrosis  From the prozac. Discussed this can sometimes be dose dependent. Will try reducing dose to see if it improves symptoms. Okay to use anti perspirant as needed.     Concentration deficit  Referral placed has struggled with some anxiety and depression stable in regards to his mental health but recommended by his therapist he may have a component of ADHD and would like to be evaluated. Orders placed today.   - Adult Mental Health  Referral; Future      The longitudinal plan of care for the diagnosis(es)/condition(s) as documented were addressed during this visit. Due to the added complexity in care, I will continue to support Emmett in the subsequent management and with ongoing continuity of care.    BMI  Estimated body mass index is 35.86 kg/m  as calculated from the following:    Height as of 7/1/24: 1.753 m (5' 9\").    Weight as of 7/1/24: 110.1 kg (242 lb 12.8 oz).         Subjective   Emmett is a 33 year old, presenting for the following health issues:  Excessive Sweating      9/6/2024     7:58 AM   Additional Questions   Roomed by Jeniffer SHEETS     History of Present Illness       Reason for visit:  Excessive sweating on palms and feet  Symptom onset:  More than a month  Symptoms include:  Excessive sweating on palms and feet  Symptom intensity:  " Severe  Symptom progression:  Staying the same  Had these symptoms before:  No  What makes it worse:  No  What makes it better:  No   He is taking medications regularly.     Started after starting prozac. Seemed to worsen after increasing to 40mg.     Mental health is stable. Concern of possible ADHD after discussion of symptoms and findings that concentration is difficult at times. He would like a referral to have this evaluated. He is not sure if he would treat with a stimulant or not but would like to be evaluated.         Review of Systems  Constitutional, HEENT, cardiovascular, pulmonary, gi and gu systems are negative, except as otherwise noted.      Objective           Vitals:  No vitals were obtained today due to virtual visit.    Physical Exam   GENERAL: alert and no distress  EYES: Eyes grossly normal to inspection.  No discharge or erythema, or obvious scleral/conjunctival abnormalities.  RESP: No audible wheeze, cough, or visible cyanosis.    SKIN: Visible skin clear. No significant rash, abnormal pigmentation or lesions.  NEURO: Cranial nerves grossly intact.  Mentation and speech appropriate for age.  PSYCH: Appropriate affect, tone, and pace of words        Video-Visit Details    Type of service:  Video Visit   Originating Location (pt. Location): Home    Distant Location (provider location):  On-site  Platform used for Video Visit: Juanita  Signed Electronically by: FIDEL Tsang CNP

## 2024-11-02 DIAGNOSIS — F32.5 MAJOR DEPRESSIVE DISORDER, SINGLE EPISODE, IN REMISSION (H): ICD-10-CM

## 2024-11-04 RX ORDER — BUPROPION HYDROCHLORIDE 150 MG/1
150 TABLET ORAL EVERY MORNING
Qty: 90 TABLET | Refills: 3 | Status: SHIPPED | OUTPATIENT
Start: 2024-11-04

## 2025-07-13 ENCOUNTER — HEALTH MAINTENANCE LETTER (OUTPATIENT)
Age: 35
End: 2025-07-13